# Patient Record
Sex: MALE | Race: WHITE | NOT HISPANIC OR LATINO | Employment: UNEMPLOYED | URBAN - METROPOLITAN AREA
[De-identification: names, ages, dates, MRNs, and addresses within clinical notes are randomized per-mention and may not be internally consistent; named-entity substitution may affect disease eponyms.]

---

## 2023-10-04 PROBLEM — G90.A POTS (POSTURAL ORTHOSTATIC TACHYCARDIA SYNDROME): Status: ACTIVE | Noted: 2023-10-04

## 2023-10-04 PROBLEM — M79.604 PAIN IN BOTH LOWER EXTREMITIES: Status: ACTIVE | Noted: 2023-10-04

## 2023-10-04 PROBLEM — M79.605 PAIN IN BOTH LOWER EXTREMITIES: Status: ACTIVE | Noted: 2023-10-04

## 2023-10-04 PROBLEM — H60.503 ACUTE OTITIS EXTERNA OF BOTH EARS: Status: ACTIVE | Noted: 2023-10-04

## 2023-10-04 PROBLEM — G62.9 SMALL FIBER NEUROPATHY: Status: ACTIVE | Noted: 2023-10-04

## 2023-10-04 PROBLEM — Q79.60 EHLERS-DANLOS SYNDROME (HHS-HCC): Status: ACTIVE | Noted: 2023-10-04

## 2023-10-04 RX ORDER — PAROXETINE HYDROCHLORIDE 40 MG/1
1 TABLET, FILM COATED ORAL DAILY
COMMUNITY
Start: 2021-11-09

## 2023-10-04 RX ORDER — GABAPENTIN 300 MG/1
CAPSULE ORAL
COMMUNITY
Start: 2022-05-05

## 2023-10-04 RX ORDER — MELOXICAM 7.5 MG/1
TABLET ORAL
COMMUNITY
Start: 2022-04-26

## 2023-10-04 RX ORDER — NADOLOL 20 MG/1
1 TABLET ORAL DAILY
COMMUNITY
Start: 2021-11-09

## 2023-10-04 RX ORDER — LAMOTRIGINE 150 MG/1
1 TABLET ORAL DAILY
COMMUNITY
Start: 2021-11-09

## 2023-10-04 RX ORDER — BUPROPION HYDROCHLORIDE 300 MG/1
1 TABLET ORAL DAILY
COMMUNITY
Start: 2021-11-09

## 2023-10-04 RX ORDER — ERGOCALCIFEROL 1.25 MG/1
CAPSULE ORAL
COMMUNITY
Start: 2021-06-24

## 2023-10-04 RX ORDER — MELOXICAM 15 MG/1
TABLET ORAL
COMMUNITY

## 2023-10-04 RX ORDER — ALBUTEROL SULFATE 90 UG/1
AEROSOL, METERED RESPIRATORY (INHALATION) 4 TIMES DAILY
COMMUNITY
Start: 2023-03-01

## 2023-10-06 ENCOUNTER — TREATMENT (OUTPATIENT)
Dept: PHYSICAL THERAPY | Facility: CLINIC | Age: 28
End: 2023-10-06
Payer: COMMERCIAL

## 2023-10-06 DIAGNOSIS — Q79.60 EHLERS-DANLOS SYNDROME (HHS-HCC): Primary | ICD-10-CM

## 2023-10-06 DIAGNOSIS — M54.50 PAIN, LOW BACK: ICD-10-CM

## 2023-10-06 PROCEDURE — 97110 THERAPEUTIC EXERCISES: CPT | Mod: GP | Performed by: PHYSICAL THERAPIST

## 2023-10-06 ASSESSMENT — PAIN SCALES - GENERAL: PAINLEVEL_OUTOF10: 3

## 2023-10-06 NOTE — PROGRESS NOTES
Physical Therapy    Physical Therapy Treatment    Patient Name: Froylan Hensley  MRN: [id number]  Today's Date: 10/6/2023     Therapy Diagnosis x 2:  M54.50 low back pain; small fiber neuropathy; Grant-Danlos syndrome - Dr. Marcano    Insurance:  Visit number: 19 / 30 ; none used   Lo performed on 10th and 19th visits.  100% , Medicaid:    Authorization not required after evaluation   LK        Subjective:   Pt reports 3/10 L nerve pain down UE arm.    Notes that chin tucks dont help his symptoms and that's its random, occurs for no reason, per patient.    Rec patient discuss this with his MD.    0/10 Low back pain.  Feels he has been better cleaning around home and cooking more since his iadls tolerances have improved.  Patient pleased with progress for low back.   Sore B LEs from the strengthening.      Precautions:   Grant-Danlos syndrome and small fiber neuropathy: sensory disturbances diffusely over the whole body, including the trunk and sometimes even the face - which patient notes he has, POTS: postural orthostatic tachycardia syndrome); bipolar depression.      Objective  Posture:  left sided thoracic convexity - very slight; right sided lumbar convexity     Dermatomes/Sensation Testing:  (L2) Anterior Thigh: left diminished per patient  (L3) Medial Knee: left diminished per patient  (L4) Medial Malleolus: left diminished per patient    Myotomes/Strength Testing (MMT in sitting):  (L2) Hip Flex (R/L): 5/5-  (L3) Knee Ext (R/L): 5/5  (L4) Ankle DF (R/L): 5/5  (S1) Ankle PF (R/L): 5/5-  Trunk Strength: 4+/5    Lumbar ROM: flexion 65, ext 35 deg    PROVISIONAL CLASSIFICATION: Bilateral lumbar derangement to the hips B (above the knee)  Baselines: pain  Treatment Principles: Body Mechanics, Postural Education, Back Care education, unloaded extension  HEP: PPU 10 reps x every hour; golfers lift with left leg up.      Treatment  Therapeutic exercise (55209):   NMVPNCR: HEP2GO, [id number]  Nustep x 10 mins, L4   s/l  hip abduction each side:  3 x 10   clamshells 3 x 10, green TB   bridges 10 x 5 sec hold, 2 sets   Swiss ball abdominal isometrics:  push downs B UEs 10 sec x 10  (discharge after this visit) - too easy  Swiss ball oblique isometrics (R), (L) UE unilateral push:  5 sec x 10 ( d c after this visit)  Swiss ball opposite UE/LE push into ball (march w/ leg so leg is off bed): 5 sec x 5 each leg  (d c after this visit)  Seated swiss ball lean backs: 10 sec x 2 reps  Supine sktc: 30 sec x 2   Supine hamstring stretch:  30 sec x 2   Supine buttocks stretch 30 sec x 2  Supine hip er stretch:  30 sec x 2  Supine quadratus lumborum stretch:  30 sec x 2  Supine IT band stretch w/ cord 30 sec x 2  prone press-ups 2 x 10  -> hep to manage low back pain    Modalities: prn     Assessment:  Pt challenged w/ hip and core strengthening on this date and c/o fatigue, but denies increased pain.   Patient noted that he noticed no difference doing chin tucks to manage left arm symptoms so recommend his follow up with PCP regarding arm symptoms.    Patient noted no back pain post tx and denied need for CP or estim.   Reported his abdominals are stronger and can perform B slr at the same time w/o issues for abdominals.   Patient progressing toward all goals listed below.   Recommend continue strengthening for B LEs and core and discharge to hep at last scheduled visit.       Goals  ST weeks: Patient independent with home exercise program.   goal met.    LT weeks: RA on 10/04/23 ->  1. Decrease complaints of pain by 80% at minimum of evaluation rating, 4 of 7 days a week at minimum.   Goal met  2. Increase trunk and LE strength by 0.5 to 1 manual testing grade to improve overall functional mobilities for IADLS and postural control.   Goal met  3. Increase spine arom by 5-10 degrees as appropriate each plane, to improve functional mobility tolerances for IADLS.   Goal met  4. Improve sitting posture to independent (no cueing needed)  to allow for improved tolerances for spinal loading. goal in progress  Goal in progress  5. Improve body mechanics awareness and performance to independent while performing clinical activities in a 45 minute treatment session. goalin progress  Goal in progress  6. Patient to demonstrate independence with home exercise program and self management of symptoms if they should re-occur.  Goal in progress  7. Improve Oswestry score to 0-19% impairment to indicate a significant improvement in overall lumbar perception of disability.  Goal in progress    Plan     Recommend continue strengthening for B LEs and core and discharge to [facility] at last scheduled visit.     Patient w/ hx of small fiber neuropathy and may continue to require some modifications in order to perform body mechanics or exercises.    Focus on:  1. core strength  2. flexibility exercises  3. balance exercises   4. muscular endurance tolerances  Patient verbal agreed with plan of care.

## 2023-10-13 ENCOUNTER — TREATMENT (OUTPATIENT)
Dept: PHYSICAL THERAPY | Facility: CLINIC | Age: 28
End: 2023-10-13
Payer: COMMERCIAL

## 2023-10-13 DIAGNOSIS — Q79.60 EHLERS-DANLOS SYNDROME (HHS-HCC): Primary | ICD-10-CM

## 2023-10-13 PROCEDURE — 97110 THERAPEUTIC EXERCISES: CPT | Mod: GP,CQ

## 2023-10-13 NOTE — PROGRESS NOTES
Physical Therapy Treatment    Patient Name: Froylan Hensley  MRN: [id number]  Today's Date: 10/13/2023     Insurance:    Visit number: 20 / 30 ; none used   Lo performed on 10th and 19th visits.  100% , Medicaid:    Authorization not required after evaluation   LK       Precautions:   Grant-Danlos syndrome and small fiber neuropathy: sensory disturbances diffusely over the whole body, including the trunk and sometimes even the face - which patient notes he has, POTS: postural orthostatic tachycardia syndrome); bipolar depression.     Start Time:  1150   Stop Time:  1230   Timed Tx (min): 40   Total Time (min): 40     SUBJECTIVE    Pain:  3/10 lbp this am, 1/10 at beginning of tx  Pain Location:  across low back  Effect on Function:   Pt states that his parents are visit and they have been doing to museums and doing more walking.   HEP:  ?    OBJECTIVE  PROVISIONAL CLASSIFICATION: Bilateral lumbar derangement to the hips B (above the knee)  Baselines: pain  Treatment Principles:  unloaded extension-> PROGRESS to loaded-> progress recovery of function  HEP: PPU 10 reps x every hour; golfers lift with left leg up.      TREATMENTS  Therapeutic exercise (27101):  40 min  [id number]: NMVPNCR, [id number]  Nustep x 10 mins, L4  *  Modified prone over EOB:  -hip extension 2 x 10 *  - donkey kick 2 x 10 *  s/l hip abduction each side:  3 x 10   clamshells 3 x 10, green TB   bridges 10 x 5 sec hold, 2 sets   Swiss ball abdominal isometrics:  push downs B UEs 10 sec x 10  (discharge after this visit) - too easy  Swiss ball oblique isometrics (R), (L) UE unilateral push:  5 sec x 10   Swiss ball opposite UE/LE push into ball (march w/ leg so leg is off bed): 5 sec x 10 each leg *  Seated swiss ball lean backs: 10 x 3 sec hold, 2 sets *  Supine sktc: 30 sec x 2 *  Supine hamstring stretch:  30 sec x 2 *  Supine buttocks stretch 30 sec x 2  Supine hip er stretch:  30 sec x 2 *  Supine quadratus lumborum stretch:  30 sec x 2  Supine IT  band stretch w/ cord 30 sec x 2   prone press-ups 2 x 10  * -> hep to manage low back pain     Modalities: prn     ASSESSMENT:  Pt denies back pain following warm-up on Nustep and performing standing back bends. Pt significantly challenged w/ seated swiss ball lean backs, but can complete w/ very small controlled rom and vc throughout. Pt denies increased back pain following treatment session.     PLAN:     Recommend continue strengthening for B LEs and core and discharge to [facility] at last scheduled visit.     Patient w/ hx of small fiber neuropathy and may continue to require some modifications in order to perform body mechanics or exercises.    Focus on:  1. core strength  2. flexibility exercises  3. balance exercises   4. muscular endurance tolerances    ----------------------------------------  KEY  *     = ex done this date  NV = next visit  AT  = perform when or as tolerated  NB = not billed  NP = not performed this date

## 2023-10-20 ENCOUNTER — TREATMENT (OUTPATIENT)
Dept: PHYSICAL THERAPY | Facility: CLINIC | Age: 28
End: 2023-10-20
Payer: COMMERCIAL

## 2023-10-20 DIAGNOSIS — Q79.60 EHLERS-DANLOS SYNDROME (HHS-HCC): Primary | ICD-10-CM

## 2023-10-20 PROCEDURE — 97110 THERAPEUTIC EXERCISES: CPT | Mod: GP,CQ

## 2023-10-20 ASSESSMENT — PAIN - FUNCTIONAL ASSESSMENT: PAIN_FUNCTIONAL_ASSESSMENT: 0-10

## 2023-10-20 ASSESSMENT — PAIN SCALES - GENERAL: PAINLEVEL_OUTOF10: 0 - NO PAIN

## 2023-10-20 NOTE — PROGRESS NOTES
Physical Therapy Treatment    Patient Name: Froylan Hensley  MRN: [id number]  Today's Date: 10/20/2023    Insurance:    Visit number: 21 / 30 ; none used   Lo performed on 10th and 19th visits.  100% , Medicaid:    Authorization not required after evaluation   LK       Precautions:   Grant-Danlos syndrome and small fiber neuropathy: sensory disturbances diffusely over the whole body, including the trunk and sometimes even the face - which patient notes he has, POTS: postural orthostatic tachycardia syndrome); bipolar depression.     Start Time:  1145   Stop Time:  1230   Timed Tx (min): 45   Total Time (min): 45     SUBJECTIVE    Pt states that his R ankle has felt really fatigued recently and he has been tripping on R foot when walking at the grocery store. Pt states that he did a lot of walking last week when his parents were visiting from out of town.   Pain:  0/10 lbp   Pain Location:  across low back  Effect on Function:  ankle fatigue when walking  HEP: compliant    OBJECTIVE  PROVISIONAL CLASSIFICATION: Bilateral lumbar derangement to the hips B (above the knee)  Baselines: pain  Treatment Principles:  unloaded extension-> PROGRESS to loaded-> progress recovery of function  HEP: PPU 10 reps x every hour; golfers lift with left leg up.      TREATMENTS  Therapeutic exercise (34519):  45 min  [id number]: NMVPNCR, AHIAV3N  Nustep x 10 mins, L4  *  Calf stretch 2 x 30 sec hold *  Seated calf raise 2 x 10, 5# *  Seated BAPS board L3 (R and L ankle) : IN/EV, PF/DF, cw, ccw 2 x 10 *   Modified prone over EOB:   -hip extension 2 x 10 *  - donkey kick 2 x 10   Supine SLR 2 x 10 *  s/l hip abduction each side:  3 x 10   clamshells 3 x 10, green TB   bridges 10 x 5 sec hold, 2 sets   Swiss ball abdominal isometrics:  push downs B UEs 10 sec x 10  (discharge after this visit) - too easy  Swiss ball oblique isometrics (R), (L) UE unilateral push:  5 sec x 10   Swiss ball opposite UE/LE push into ball (march w/ leg so leg is off  bed): 5 sec x 10 each leg   Seated swiss ball lean backs: 10 x 3 sec hold, 2 sets   Supine sktc: 30 sec x 2 *  Supine hamstring stretch:  30 sec x 2   Supine buttocks stretch 30 sec x 2   Supine hip er stretch:  30 sec x 2 *  Supine quadratus lumborum stretch:  30 sec x 2  Supine IT band stretch w/ cord 30 sec x 2   prone press-ups 2 x 10  * -> hep to manage low back pain     Modalities: prn     ASSESSMENT:    Pt challenged w/ seated BAPS board cw and ccw rotation coordination w/ L ankle. Pt c/o hip and hamstring fatigue following modified prone EOB hip extension. No c/o increased back pain following treatment session.     PLAN:     Recommend continue strengthening for B LEs and core and discharge to [facility] at last scheduled visit.     Patient w/ hx of small fiber neuropathy and may continue to require some modifications in order to perform body mechanics or exercises.    Focus on:  1. core strength  2. flexibility exercises  3. balance exercises   4. muscular endurance tolerances    ----------------------------------------  KEY  *     = ex done this date  NV = next visit  AT  = perform when or as tolerated  NB = not billed  NP = not performed this date

## 2023-10-27 ENCOUNTER — TREATMENT (OUTPATIENT)
Dept: PHYSICAL THERAPY | Facility: CLINIC | Age: 28
End: 2023-10-27
Payer: COMMERCIAL

## 2023-10-27 DIAGNOSIS — M54.50 PAIN, LOW BACK: Primary | ICD-10-CM

## 2023-10-27 PROCEDURE — 97110 THERAPEUTIC EXERCISES: CPT | Mod: GP

## 2023-10-27 NOTE — PROGRESS NOTES
Physical Therapy Treatment and Discharge Summary    Patient Name: Froylan Hensley  MRN: [id number]  Today's Date: 10/27/2023    Insurance:    Visit number: 22 / 30 ; none used   Lo performed on 10th and 19th visits.  100% , Medicaid:    Authorization not required after evaluation   LK       Precautions:   Grant-Danlos syndrome and small fiber neuropathy: sensory disturbances diffusely over the whole body, including the trunk and sometimes even the face - which patient notes he has, POTS: postural orthostatic tachycardia syndrome); bipolar depression.     Start Time:  1130   Stop Time:  1215   Timed Tx (min): 45   Total Time (min): 45     SUBJECTIVE    Reports that overall he is feeling much better. Reports that he got lost on a hike last week and was able to walk up a hill and longer than expected without any pain or soreness. Reports that he does not have any questions about his home program.   Pain:  1/10 lbp   Pain Location:  across low back    HEP: compliant    OBJECTIVE       TREATMENTS  Therapeutic exercise (13170):  45 min  [id number]: NMVPNCR, [id number]  Nustep x 10 mins, L4  *  Calf stretch 2 x 30 sec hold *  Seated calf raise 2 x 10, 5# *  Seated BAPS board L3 (R and L ankle) : IN/EV, PF/DF, cw, ccw 2 x 10   hip extension 2 x 10 *  donkey kick 2 x 10 *  Supine SLR 2 x 10 *  s/l hip abduction each side:  3 x 10   clamshells 3 x 10, green TB   bridges 10 x 5 sec hold, 2 sets *  Swiss ball abdominal isometrics:  push downs B UEs 10 sec x 10  (discharge after this visit) - too easy  Swiss ball oblique isometrics (R), (L) UE unilateral push:  5 sec x 10   Swiss ball opposite UE/LE push into ball (march w/ leg so leg is off bed): 5 sec x 10 each leg   Seated swiss ball lean backs: 10 x 3 sec hold, 2 sets   Supine sktc: 30 sec x 2 *  Supine hamstring stretch:  30 sec x 2 *  Supine buttocks stretch 30 sec x 2   Supine hip er stretch:  30 sec x 2 *  Supine quadratus lumborum stretch:  30 sec x 2  Supine IT band stretch w/  cord 30 sec x 2 *  prone press-ups 2 x 10  * -> hep to manage low back pain     Modalities: prn     ASSESSMENT:    Demonstrates ability to complete all exercises throughout session with minimal cues for posture and without complaints of pain. Fatigue and difficulty balancing noted with standing hip stabilization activities. Discussed modifying hip extension activities to upright posture for ability to safely complete at home. Discharge to independent HEP at this time.     PLAN:       Discharge to independent [facility].     LT weeks: RA on 10/04/23 ->  1. Decrease complaints of pain by 80% at minimum of evaluation rating, 4 of 7 days a week at minimum.   Goal met  2. Increase trunk and LE strength by 0.5 to 1 manual testing grade to improve overall functional mobilities for IADLS and postural control.   Goal met  3. Increase spine arom by 5-10 degrees as appropriate each plane, to improve functional mobility tolerances for IADLS.   Goal met  4. Improve sitting posture to independent (no cueing needed) to allow for improved tolerances for spinal loading. Goal met  5. Improve body mechanics awareness and performance to independent while performing clinical activities in a 45 minute treatment session. Goal met  6. Patient to demonstrate independence with home exercise program and self management of symptoms if they should re-occur.  Goal in progress  7. Improve Oswestry score to 0-19% impairment to indicate a significant improvement in overall lumbar perception of disability.      ----------------------------------------  KEY  *     = ex done this date  NV = next visit  AT  = perform when or as tolerated  NB = not billed  NP = not performed this date

## 2024-05-14 ENCOUNTER — HOSPITAL ENCOUNTER (EMERGENCY)
Facility: HOSPITAL | Age: 29
Discharge: HOME | End: 2024-05-14
Attending: EMERGENCY MEDICINE
Payer: COMMERCIAL

## 2024-05-14 ENCOUNTER — APPOINTMENT (OUTPATIENT)
Dept: RADIOLOGY | Facility: HOSPITAL | Age: 29
End: 2024-05-14
Payer: COMMERCIAL

## 2024-05-14 VITALS
TEMPERATURE: 97.2 F | OXYGEN SATURATION: 98 % | RESPIRATION RATE: 16 BRPM | WEIGHT: 205 LBS | BODY MASS INDEX: 31.07 KG/M2 | HEIGHT: 68 IN | SYSTOLIC BLOOD PRESSURE: 118 MMHG | HEART RATE: 68 BPM | DIASTOLIC BLOOD PRESSURE: 68 MMHG

## 2024-05-14 DIAGNOSIS — S09.90XA CLOSED HEAD INJURY, INITIAL ENCOUNTER: Primary | ICD-10-CM

## 2024-05-14 PROCEDURE — 99285 EMERGENCY DEPT VISIT HI MDM: CPT | Mod: 25

## 2024-05-14 PROCEDURE — 73130 X-RAY EXAM OF HAND: CPT | Mod: RT

## 2024-05-14 PROCEDURE — 73130 X-RAY EXAM OF HAND: CPT | Mod: RIGHT SIDE | Performed by: RADIOLOGY

## 2024-05-14 PROCEDURE — 70450 CT HEAD/BRAIN W/O DYE: CPT | Performed by: RADIOLOGY

## 2024-05-14 PROCEDURE — 72125 CT NECK SPINE W/O DYE: CPT | Performed by: RADIOLOGY

## 2024-05-14 PROCEDURE — 72125 CT NECK SPINE W/O DYE: CPT

## 2024-05-14 PROCEDURE — 70450 CT HEAD/BRAIN W/O DYE: CPT

## 2024-05-14 PROCEDURE — 2500000005 HC RX 250 GENERAL PHARMACY W/O HCPCS: Performed by: EMERGENCY MEDICINE

## 2024-05-14 RX ORDER — ACETAMINOPHEN 325 MG/1
975 TABLET ORAL ONCE
Status: COMPLETED | OUTPATIENT
Start: 2024-05-14 | End: 2024-05-14

## 2024-05-14 RX ORDER — ONDANSETRON 4 MG/1
4 TABLET, ORALLY DISINTEGRATING ORAL ONCE
Status: COMPLETED | OUTPATIENT
Start: 2024-05-14 | End: 2024-05-14

## 2024-05-14 RX ADMIN — ONDANSETRON 4 MG: 4 TABLET, ORALLY DISINTEGRATING ORAL at 18:33

## 2024-05-14 RX ADMIN — ACETAMINOPHEN 975 MG: 325 TABLET ORAL at 18:33

## 2024-05-14 ASSESSMENT — COLUMBIA-SUICIDE SEVERITY RATING SCALE - C-SSRS
1. IN THE PAST MONTH, HAVE YOU WISHED YOU WERE DEAD OR WISHED YOU COULD GO TO SLEEP AND NOT WAKE UP?: NO
2. HAVE YOU ACTUALLY HAD ANY THOUGHTS OF KILLING YOURSELF?: NO
6. HAVE YOU EVER DONE ANYTHING, STARTED TO DO ANYTHING, OR PREPARED TO DO ANYTHING TO END YOUR LIFE?: NO

## 2024-05-14 ASSESSMENT — LIFESTYLE VARIABLES
HAVE PEOPLE ANNOYED YOU BY CRITICIZING YOUR DRINKING: NO
HAVE YOU EVER FELT YOU SHOULD CUT DOWN ON YOUR DRINKING: NO
EVER HAD A DRINK FIRST THING IN THE MORNING TO STEADY YOUR NERVES TO GET RID OF A HANGOVER: NO
TOTAL SCORE: 0
EVER FELT BAD OR GUILTY ABOUT YOUR DRINKING: NO

## 2024-05-14 ASSESSMENT — PAIN - FUNCTIONAL ASSESSMENT
PAIN_FUNCTIONAL_ASSESSMENT: 0-10
PAIN_FUNCTIONAL_ASSESSMENT: 0-10

## 2024-05-14 ASSESSMENT — PAIN SCALES - GENERAL
PAINLEVEL_OUTOF10: 6
PAINLEVEL_OUTOF10: 2

## 2024-05-14 ASSESSMENT — PAIN DESCRIPTION - LOCATION: LOCATION: HEAD

## 2024-05-14 ASSESSMENT — PAIN DESCRIPTION - PAIN TYPE: TYPE: ACUTE PAIN

## 2024-05-14 NOTE — ED TRIAGE NOTES
PT FROM HOME AFTER FALLING DOWN [address] [facility] [facility] LAST NIGHT AROUND 10 PM. ENDORSES RIGHT SIDE HEAD INJURY. DENIES LOC. DENIES THINNERS. PT ENDORSES PAIN TO RIGHT HAND, 4TH FINGER. PAIN TO RIGHT HIP AND SCRAPE TO RIGHT KNEE. PT STATES TODAY HE HAS HEADACHE, DIZZINESS WITH INTERMITTENT NAUSEA. DENIES BLURRY VISION, DOUBLE VISION. ENDORSES RIGHT SIDE EAR INFECTION THAT HE JUST COMPLETED ABX FOR.

## 2024-05-14 NOTE — ED PROVIDER NOTES
HPI   Chief Complaint   Patient presents with    Fall       Patient is a 28-year-old male, medical history significant for POTS and bipolar disease that presents to the emergency department after a fall.  Patient states that last evening, he fell down somewhere between 10 and 15 stairs.  He struck his head.  He is unsure if he lost consciousness, but throughout the day has had a persistent headache and states that he is felt unsteady on his feet.  He denies visual change.  He has had no vomiting.  He states that he did suffer a bruise to his knee but has been ambulatory.  He also suffered an injury to his right hand.  He is not on anticoagulants.                          Jeanna Coma Scale Score: 15                     Patient History   Past Medical History:   Diagnosis Date    Bipolar disorder, unspecified (Multi)     Bipolar disease, chronic    Grant-Danlos syndrome, unspecified (HHS-HCC)     Grant-Danlos disease    Familial dysautonomia (lenny-day) (Multi)     Dysautonomia    Postural orthostatic tachycardia syndrome (POTS)     POTS (postural orthostatic tachycardia syndrome)     Past Surgical History:   Procedure Laterality Date    OTHER SURGICAL HISTORY  05/05/2022    No history of surgery     Family History   Problem Relation Name Age of Onset    Grant-Danlos syndrome Brother      Other (Cerebrovascular accident) Maternal Grandfather      Seizures Maternal Grandfather       Social History     Tobacco Use    Smoking status: Not on file    Smokeless tobacco: Not on file   Substance Use Topics    Alcohol use: Not on file    Drug use: Not on file       Physical Exam   ED Triage Vitals [05/14/24 1732]   Temperature Heart Rate Respirations BP   36.1 °C (97 °F) 80 18 123/70      Pulse Ox Temp Source Heart Rate Source Patient Position   96 % Temporal Monitor Sitting      BP Location FiO2 (%)     Right arm --       Physical Exam  Vitals and nursing note reviewed.   Constitutional:       General: He is not in acute  distress.     Appearance: He is well-developed.   HENT:      Head: Normocephalic and atraumatic.   Eyes:      Conjunctiva/sclera: Conjunctivae normal.   Cardiovascular:      Rate and Rhythm: Normal rate and regular rhythm.      Heart sounds: No murmur heard.  Pulmonary:      Effort: Pulmonary effort is normal. No respiratory distress.      Breath sounds: Normal breath sounds.   Abdominal:      Palpations: Abdomen is soft.      Tenderness: There is no abdominal tenderness.   Musculoskeletal:         General: No swelling.      Right hand: Decreased range of motion.        Arms:       Cervical back: Neck supple.   Skin:     General: Skin is warm and dry.      Capillary Refill: Capillary refill takes less than 2 seconds.   Neurological:      General: No focal deficit present.      Mental Status: He is alert and oriented to person, place, and time.   Psychiatric:         Mood and Affect: Mood normal.         ED Course & MDM   ED Course as of 05/14/24 1823   e May 14, 2024   1816 CT head shows no acute intracranial process.  CT cervical spine also unremarkable. [MK]   1818 X-ray of the hand shows known fracture dislocation. [MK]      ED Course User Index  [MK] Froylan Huddleston MD         Diagnoses as of 05/14/24 1823   Closed head injury, initial encounter       Medical Decision Making  Medical Decision Making: Patient was activated as a limited trauma based on mechanism.  He was unsure if he lost consciousness.  On arrival, he is very well-appearing.  He has a GCS of 15.  CT head and cervical spine were both obtained.  These were negative for acute intracranial process.  X-ray of the hand was also obtained.  This is also negative.  Patient will be treated with anti-inflammatories and antiemetics.  He will be discharged home.    Differential Diagnoses Considered: Concussion, intracranial hemorrhage, skull fracture, cervical spine fracture    Chronic Medical Conditions Significantly Affecting Care: History bipolar  disorder    External Records Reviewed: I reviewed recent and relevant outside records including: No recent visits    Independent Interpretation of Studies:  I independently interpreted: CT and x-ray obtained reviewed    Escalation of Care:  Appropriate for outpatient management    Social Determinants of Health Significantly Affecting Care:  No social determinants    Prescription Drug Consideration: Zofran as needed    Diagnostic testing considered: Noncontributory              Procedure  Procedures     Froylan Huddleston MD  05/14/24 0041

## 2025-02-06 ENCOUNTER — APPOINTMENT (OUTPATIENT)
Dept: OPHTHALMOLOGY | Facility: CLINIC | Age: 30
End: 2025-02-06
Payer: COMMERCIAL

## 2025-02-06 DIAGNOSIS — H52.13 MYOPIA, BILATERAL: ICD-10-CM

## 2025-02-06 DIAGNOSIS — Z87.828 HX OF HEAD INJURY: ICD-10-CM

## 2025-02-06 DIAGNOSIS — H53.9 VISUAL DISTURBANCE: Primary | ICD-10-CM

## 2025-02-06 DIAGNOSIS — H52.223 REGULAR ASTIGMATISM OF BOTH EYES: ICD-10-CM

## 2025-02-06 DIAGNOSIS — Q79.60 EHLERS-DANLOS SYNDROME (HHS-HCC): ICD-10-CM

## 2025-02-06 DIAGNOSIS — H53.2 DIPLOPIA: ICD-10-CM

## 2025-02-06 LAB
AVERAGE RNFL TODAY (OD): 88
AVERAGE RNFL TODAY (OS): 87
C/D RATIO TODAY (OD): 0.47
C/D RATIO TODAY (OS): 0.42

## 2025-02-06 PROCEDURE — 92134 CPTRZ OPH DX IMG PST SGM RTA: CPT | Performed by: OPHTHALMOLOGY

## 2025-02-06 PROCEDURE — 92015 DETERMINE REFRACTIVE STATE: CPT | Performed by: OPHTHALMOLOGY

## 2025-02-06 PROCEDURE — 92004 COMPRE OPH EXAM NEW PT 1/>: CPT | Performed by: OPHTHALMOLOGY

## 2025-02-06 RX ORDER — ATORVASTATIN CALCIUM 10 MG/1
10 TABLET, FILM COATED ORAL DAILY
COMMUNITY

## 2025-02-06 ASSESSMENT — TONOMETRY
OD_IOP_MMHG: 13
IOP_METHOD: GOLDMANN APPLANATION
OS_IOP_MMHG: 13

## 2025-02-06 ASSESSMENT — REFRACTION_MANIFEST
OS_CYLINDER: -1.00
OS_AXIS: 026
OD_AXIS: 145
OS_SPHERE: -3.00
OD_AXIS: 126
OD_CYLINDER: -0.75
OD_SPHERE: -3.25
OD_CYLINDER: -0.50
OS_AXIS: 047
OS_SPHERE: -3.25
OS_CYLINDER: -0.75
OD_SPHERE: -3.50

## 2025-02-06 ASSESSMENT — REFRACTION_WEARINGRX
OD_SPHERE: -3.00
OS_SPHERE: -3.00
OD_AXIS: 145
OS_CYLINDER: SPHERE
OD_CYLINDER: -0.50

## 2025-02-06 ASSESSMENT — VISUAL ACUITY
METHOD: SNELLEN - LINEAR
CORRECTION_TYPE: GLASSES
OD_CC: 20/25
OS_CC: 20/30

## 2025-02-06 ASSESSMENT — CONF VISUAL FIELD
OD_NORMAL: 1
OD_INFERIOR_NASAL_RESTRICTION: 0
OS_INFERIOR_NASAL_RESTRICTION: 0
OS_SUPERIOR_TEMPORAL_RESTRICTION: 0
OD_INFERIOR_TEMPORAL_RESTRICTION: 0
OS_INFERIOR_TEMPORAL_RESTRICTION: 0
OD_SUPERIOR_TEMPORAL_RESTRICTION: 0
OS_NORMAL: 1
OD_SUPERIOR_NASAL_RESTRICTION: 0
OS_SUPERIOR_NASAL_RESTRICTION: 0

## 2025-02-06 ASSESSMENT — CUP TO DISC RATIO
OS_RATIO: 0.2
OD_RATIO: 0.2

## 2025-02-06 ASSESSMENT — ENCOUNTER SYMPTOMS: EYES NEGATIVE: 1

## 2025-02-06 ASSESSMENT — SLIT LAMP EXAM - LIDS
COMMENTS: NORMAL
COMMENTS: NORMAL

## 2025-02-06 ASSESSMENT — EXTERNAL EXAM - RIGHT EYE: OD_EXAM: NORMAL

## 2025-02-06 ASSESSMENT — EXTERNAL EXAM - LEFT EYE: OS_EXAM: NORMAL

## 2025-02-06 NOTE — PROGRESS NOTES
Assessment/Plan   Diagnoses and all orders for this visit:  Visual disturbance  Diplopia  Hx of head injury  Refer to Dr. Mccoy for evaluation and management    Grant-Danlos syndrome (HHS-HCC)  -not managed    Myopia, bilateral  Regular astigmatism of both eyes  Refractive error  -give Rx for new glasses    Return for a dilated exam in  12  months or sooner if having any problems

## 2025-02-26 ENCOUNTER — EVALUATION (OUTPATIENT)
Dept: PHYSICAL THERAPY | Facility: CLINIC | Age: 30
End: 2025-02-26
Payer: COMMERCIAL

## 2025-02-26 DIAGNOSIS — G90.A POTS (POSTURAL ORTHOSTATIC TACHYCARDIA SYNDROME): ICD-10-CM

## 2025-02-26 PROCEDURE — 97530 THERAPEUTIC ACTIVITIES: CPT | Mod: GP

## 2025-02-26 PROCEDURE — 97161 PT EVAL LOW COMPLEX 20 MIN: CPT | Mod: GP

## 2025-02-26 ASSESSMENT — ENCOUNTER SYMPTOMS
LOSS OF SENSATION IN FEET: 1
DEPRESSION: 0
OCCASIONAL FEELINGS OF UNSTEADINESS: 0

## 2025-02-26 NOTE — PROGRESS NOTES
Physical Therapy    Physical Therapy Evaluation and Treatment      Patient Name: Froylan Hensley  MRN: [id number]  Today's Date: 2/26/2025  Time Calculation  Start Time: 1402  Stop Time: 1442  Time Calculation (min): 40 min    Insurance - reviewed   Visit number: 1 (updated 02/26/25)   Payor: [facility] / Plan: [facility] / Product Type: *No Product type* /    Needs auth*     Referring Provider: Khalida Dahl DO   Surgery: No surgery found, No surgery found.  Days since surgery: No surgery found       Assessment:      Froylan Hensley  is a 29 y.o. old patient who participated in a physical therapy evaluation today due to LE weakness and instability. 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 Froylan's impairments include: postural deficits, pain, and decreased strength.   Due to these impairments, he has the following functional limitations and participation restrictions:  increased fall risk, difficulty with ambulation, difficulty with stair negotiation, difficulty performing household activities, difficulty performing recreational activities, and increased time to complete ADLs/IADLs. Froylan's clinical presentation is Stable and/or uncomplicated characteristics with the level of complexity being low. Forylan's potential for rehab is good.  Skilled physical therapy services are appropriate and beneficial in order to achieve measurable and meaningful change in the objective tests and measures. Utilization of skilled physical therapy services will aid in advancing his functional status and attaining his therapy-related goals. Froylan verbalized understanding and is in agreement with all goals and plan of care.  Froylan left session with all questions answered and no increase in symptoms.      Plan:  Aquatic therapy for strength and stability       Planned Interventions: Therapeutic Exercise, Therapeutic Activity, Manual Therapy,  Neuromuscular Re-Education, E-stim, Ultrasound, Aquatic Therapy   Frequency and Duration: 1-2x/week for 6 weeks    Plan of Care Agreement: Pt had input in and is agreeable to POC.       Goals:    STG's (within 2 weeks)  Froylan Hensley will decrease pain by >/= 2/10 points from baseline to improve ability to perform household/recreational activities.    Froylan Hensley will demonstrate independence,  excellent understanding of and report compliance with at least 3 exercises in his HEP to facilitate the learning process to maximize PT benefits and to facilitate independent rehab program upon discharge.    LTG's (by discharge)    Froylan will increase strength to 5/5 to improve ability to perform household/recreational activities.     Froylan will demonstrate ascending/descending >/=12 steps with alternating pattern and no reports of pain or discomfort.    Froylan will demonstrate ambulating >/=1000 ft with no reports of pain or discomfort.      Fryolan Hensley will decrease pain to 0-2/10 to improve ability to perform household/recreational activities.    Froylan Hensley will demonstrate independence,  excellent understanding of and report compliance with HEP to facilitate the learning process to maximize PT benefits and to facilitate independent rehab program upon discharge.      Current Problem:   Problem List Items Addressed This Visit             ICD-10-CM    POTS (postural orthostatic tachycardia syndrome) G90.A    Relevant Orders    Follow Up In Physical Therapy         Subjective    Pt reports that he has EDS and has pain in the B knees and instability in the ankles. Reports that he has trouble on stairs and with balance. Reports some neuropathy in the feet and burning sensations in the sacral area. States that he previously was very active and was participating in intramural sports but then began having increased pain and instability so he slowed down with this. Reports that crouching down will lock his knees up and  cause discomfort. States that pain comes and goes. Reports that he has had multiple falls in the past year- one resulting in a concussion and one resulting in a knee injury. Lives alone and does not feel safe at all times when home alone.     General:  General  Reason for Referral: LE weakness and instability  Referred By: JUAN Dahl    Froylan Hensley  is a 29 y.o. male  presenting to the clinic with chief complaint of LE weakness, instability.     Froylan Hensley  reports symptoms began around 2018.     Reports symptoms are better with N/A    Reports symptoms are worse with descending stairs, walking long distances, walking on uneven surfaces, squatting, lifting.     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     Prior Level of Function: fully functional with some discomfort     Exercise:  exercises from previous PT and OT, walking     Functional Limitations: descending stairs, walking long distances, walking on uneven surfaces, squatting, lifting     Pt goals for therapy: improve stability, gaining an understanding of limitations     Precautions:  Fall Risk: Low   Denies: Cancer, Diabetes, Hypertension, and other known pulmonary problems  Past Surgical history: N/A  Past Medical history: other known cardiac problems- POTS     Pain:  3/10  pain location: lower back, B knees      Pain description: achy, sore       Objective Measures:   Objective     Posture: forward head, rounded shoulders     Gait: normal     Squat: knee strategy, decreased depth    Balance: SL stance- 20s B     ROM: (WFL unless otherwise noted)   R hip flexion:  L hip flexion:  R hip abduction:  L hip abduction:  R hip extension:  L hip extension:     R knee flexion:  L  knee flexion:  R knee extension:  L knee extension:    R ankle dorsiflexion:  L ankle dorsiflexion:  R ankle plantar flexion:  L ankle plantar flexion:  R ankle inversion:  L ankle inversion:  R ankle eversion:  L ankle eversion:      MMT:  R hip flexion: 5  L hip flexion: 5  R hip abduction: 5  L hip abduction: 5  R hip extension: 5  L hip extension: 5    R knee flexion: 5  L knee flexion: 5  R knee extension: 5  L knee extension: 5    R ankle dorsiflexion: 5  L ankle dorsiflexion: 5  R ankle plantar flexion: 5  L ankle plantar flexion: 5  R ankle inversion: 4+  L ankle inversion: 4+  R ankle eversion: 4+  L ankle eversion: 4+       Outcome Measures:  Other Measures  Lower Extremity Funtional Score (LEFS): 53       Treatments: * indicated new exercises for HEP    Therapeutic Activity: 10 minutes   - Discussed and demonstrated four way ankle green TB  - Discussed nature of diagnosis and how he will benefit the most from strengthening       EDUCATION:     -Educated Froylan  on the role of PT and PT POC  -Educated Froylan regarding benefit and purpose of skilled PT services along with results of examination findings and how this correlates to their chief complaint.   -Answered Froylan's questions in full.  -Educated Froylan on relevant anatomy and the rationale for exercises  -Froylan Hensley advised to hold any ex if it increases pain, Froylan Hensley verbalized understanding      Time Calculation  Start Time: 1402  Stop Time: 1442  Time Calculation (min): 40 min  PT Evaluation Time Entry  PT Evaluation (Low) Time Entry: 30  PT Therapeutic Procedures Time Entry  Therapeutic Activity Time Entry: 10

## 2025-03-04 ENCOUNTER — TREATMENT (OUTPATIENT)
Dept: PHYSICAL THERAPY | Facility: CLINIC | Age: 30
End: 2025-03-04
Payer: COMMERCIAL

## 2025-03-04 DIAGNOSIS — G90.A POTS (POSTURAL ORTHOSTATIC TACHYCARDIA SYNDROME): ICD-10-CM

## 2025-03-04 PROCEDURE — 97113 AQUATIC THERAPY/EXERCISES: CPT | Mod: GP

## 2025-03-04 NOTE — PROGRESS NOTES
PHYSICAL THERAPY TREATMENT NOTE    Patient Name:  Froylan Hensley   Patient MRN: [id number]  Date: 03/04/25  Time Calculation  Start Time: 0247  Stop Time: 0325  Time Calculation (min): 38 min      Insurance:  Visit number: 2 (updated 03/04/25)   Payor: [facility] PLAN / Plan: [facility] PLAN / Product Type: *No Product type* /    Authorization or Plan of Care date Range: 2/26/26  Needs auth after 7th visit**       Referring Provider: Darin Mayorga   Surgery: No surgery found, No surgery found.  Days since surgery: No surgery found     Therapy diagnoses:   1. POTS (postural orthostatic tachycardia syndrome)  Follow Up In Physical Therapy           General:  Reason for visit: EDS with LE weakness and instability     Assessment:  Patient is a 29 year old with a diagnosis of EDS with LE weakness and instability.   BASELINES: pain, decreased strength, decreased balance/stability, and tenderness   TREATMENT STRATEGIES: LE strengthening, balance, and body mechanics  RESPONSE TO TX: Pt tolerated Tx well. Enjoyed deep water bicycling. Some trouble with form and control in standing three way hip and step ups. Reports discomfort in the toes in the pool which feels better in the deep end.     Plan:  Squats and noodle resistance in water     Precautions:  POTS  Fall Risk: low     Subjective:   Patient reports that he was not feeling good this past week as far as body aches. Reports that he was downtown this past weekend and was on his feet a lot which caused increased soreness and discomfort.    Pain (0-10): 3   HEP adherence / understanding: continuing HEP from [place] PT [place].       Objective:  N/A      Treatment Performed: * indicates new exercises for HEP     Therapeutic Exercise:    minutes       Therapeutic Activity:   minutes       Neuromuscular Re-education:   minutes        Gait Training:   minutes      Manual Therapy:   minutes      Modalities:   -Electrical Stimulation          minutes  -Ultrasound            minutes  -Iontophoresis                     minutes  -Cold Pack            minutes  -Mechanical Traction           minutes        Aquatic: 38 minutes   - walking x 3 laps  - side stepping x 2 laps  - heel/toe raises x 20 ea   - three way hip x 15 ea B    Red step- fwd/lateral x 10 ea B    Deep end  - bicycling x 8 min   - skiers x 5 min

## 2025-03-07 ENCOUNTER — TREATMENT (OUTPATIENT)
Dept: PHYSICAL THERAPY | Facility: CLINIC | Age: 30
End: 2025-03-07
Payer: COMMERCIAL

## 2025-03-07 DIAGNOSIS — G90.A POTS (POSTURAL ORTHOSTATIC TACHYCARDIA SYNDROME): Primary | ICD-10-CM

## 2025-03-07 PROCEDURE — 97113 AQUATIC THERAPY/EXERCISES: CPT | Mod: GP,CQ

## 2025-03-07 NOTE — PROGRESS NOTES
PHYSICAL THERAPY TREATMENT NOTE    Patient Name:  Froylan Hensley   Patient MRN: [id number]  Date: 03/07/25  Time Calculation  Start Time: 0115  Stop Time: 0158  Time Calculation (min): 43 min      Insurance:  Visit number: 3 (updated 03/07/25)   Payor: [facility] PLAN / Plan: [facility] PLAN / Product Type: *No Product type* /    Authorization or Plan of Care date Range: 2/26/26  Needs auth after 7th visit**       Referring Provider: Khalida Dahl DO   Surgery: No surgery found, No surgery found.  Days since surgery: No surgery found     Therapy diagnoses:   1. POTS (postural orthostatic tachycardia syndrome)               General:  Reason for visit: EDS with LE weakness and instability     Assessment:  Patient is a 29 year old with a diagnosis of EDS with LE weakness and instability.   BASELINES: pain, decreased strength, decreased balance/stability, and tenderness   TREATMENT STRATEGIES: LE strengthening, balance, and body mechanics  RESPONSE TO TX: Pt c/o difficulty maintaining balance during forward ambulation in chest height water. Pt c/o cramping in feet and difficulty controlling knee hyper-extension during sls hip strengthening exercises. Pt c/o fatigue following treatment session, but no c/o increased pain.     Plan:  Squats and noodle resistance in water     Precautions:  POTS  Fall Risk: low     Subjective:   Pt states that he is having less pain today than last visit.    Pain (0-10): 2-3  HEP adherence / understanding: continuing HEP from prior PT [place] land.       Objective:  N/A      Treatment Performed: * indicates new exercises for HEP     Therapeutic Exercise:    minutes       Therapeutic Activity:   minutes       Neuromuscular Re-education:   minutes       Gait Training:   minutes      Manual Therapy:   minutes      Modalities:   -Electrical Stimulation          minutes  -Ultrasound             minutes  -Iontophoresis                     minutes  -Cold Pack            minutes  -Mechanical Traction           minutes        Aquatic: 43 minutes   - walking x 3 laps  - side stepping x 2 laps  - heel/toe raises x 20 ea   - three way hip x 15 ea B    Red step- fwd/lateral x 10 ea B: np    Deep end   - bicycling x 8 min (fwd & backward)  - skiers x 2 min   - scissor x 2 min

## 2025-03-10 ENCOUNTER — TREATMENT (OUTPATIENT)
Dept: PHYSICAL THERAPY | Facility: CLINIC | Age: 30
End: 2025-03-10
Payer: COMMERCIAL

## 2025-03-10 DIAGNOSIS — G90.A POTS (POSTURAL ORTHOSTATIC TACHYCARDIA SYNDROME): Primary | ICD-10-CM

## 2025-03-10 DIAGNOSIS — Q79.60 EHLERS-DANLOS SYNDROME (HHS-HCC): ICD-10-CM

## 2025-03-10 PROCEDURE — 97113 AQUATIC THERAPY/EXERCISES: CPT | Mod: GP

## 2025-03-10 NOTE — PROGRESS NOTES
PHYSICAL THERAPY TREATMENT NOTE    Patient Name:  Froylan Hensley   Patient MRN: [id number]  Date: 03/10/25  Time Calculation  Start Time: 0415  Stop Time: 0453  Time Calculation (min): 38 min      Insurance:  Visit number: 4 (updated 03/10/25)   Payor: [facility] PLAN / Plan: [facility] [employer] PLAN / Product Type: *No Product type* /    Authorization or Plan of Care date Range: 2/26/26  Needs auth after 7th visit**       Referring Provider: Darin Mayorga   Surgery: No surgery found, No surgery found.  Days since surgery: No surgery found     Therapy diagnoses:   1. POTS (postural orthostatic tachycardia syndrome)  Follow Up In Physical Therapy      2. Grant-Danlos syndrome (HHS-HCC)               General:  Reason for visit: EDS with LE weakness and instability     Assessment:  Patient is a 29 year old with a diagnosis of EDS with LE weakness and instability.   BASELINES: pain, decreased strength, decreased balance/stability, and tenderness   TREATMENT STRATEGIES: LE strengthening, balance, and body mechanics  RESPONSE TO TX: Pt tolerated tx fairly well. Fatigued with step ups in the pool on this date. Requires wall for balance in shallow end.     Plan:  Squats and noodle resistance in water     Precautions:  POTS  Fall Risk: low     Subjective:   Pt states that his body feels achy but okay. Reports that he is sore following aquatic sessions but does not have increased pain. Has been seeing a therapist regarding mental health as this plays a part in ability to complete physical activity.    Pain (0-10): 2-3  HEP adherence / understanding: continuing HEP from prior PT [place] land.       Objective:  N/A      Treatment Performed: * indicates new exercises for HEP     Therapeutic Exercise:    minutes       Therapeutic Activity:   minutes       Neuromuscular Re-education:   minutes       Gait Training:   minutes      Manual  Therapy:   minutes      Modalities:   -Electrical Stimulation          minutes  -Ultrasound            minutes  -Iontophoresis                     minutes  -Cold Pack            minutes  -Mechanical Traction           minutes        Aquatic: 38 minutes   - walking x 3 laps  - side stepping x 2 laps  - marching x 20 alt  - HS curls x 20 ea   - heel/toe raises x 20 ea   - three way hip x 15 ea B    Red step- fwd/lateral x 10 ea B    Deep end   - bicycling x 8 min (fwd & backward)  - skiers x 2 min   - scissor x 2 min

## 2025-03-19 ENCOUNTER — TREATMENT (OUTPATIENT)
Dept: PHYSICAL THERAPY | Facility: CLINIC | Age: 30
End: 2025-03-19
Payer: COMMERCIAL

## 2025-03-19 DIAGNOSIS — G90.A POTS (POSTURAL ORTHOSTATIC TACHYCARDIA SYNDROME): ICD-10-CM

## 2025-03-19 PROCEDURE — 97113 AQUATIC THERAPY/EXERCISES: CPT | Mod: GP,CQ

## 2025-03-19 NOTE — PROGRESS NOTES
PHYSICAL THERAPY TREATMENT NOTE    Patient Name:  Froylan Hensley   Patient MRN: [id number]  Date: 03/19/25  Time Calculation  Start Time: 0920  Stop Time: 1000  Time Calculation (min): 40 min      Insurance:  Visit number: 5 (updated 03/19/25)   Payor: [facility] PLAN / Plan: [facility] [email or web address] PLAN / Product Type: *No Product type* /    Authorization or Plan of Care date Range: 2/26/26  Needs auth after 7th visit**       Referring Provider: Darin Mayorga   Surgery: No surgery found, No surgery found.  Days since surgery: No surgery found     Therapy diagnoses:   1. POTS (postural orthostatic tachycardia syndrome)  Follow Up In Physical Therapy           General:  Reason for visit: EDS with LE weakness and instability     Assessment:  Patient is a 29 year old with a diagnosis of EDS with LE weakness and instability.   BASELINES: pain, decreased strength, decreased balance/stability, and tenderness   TREATMENT STRATEGIES: LE strengthening, balance, and body mechanics  RESPONSE TO TX: Pt able to complete LE aquatic exercises w/o complaints of pain, but c/o muscle fatigue. Added UE strengthening w/ abdominal bracing. Pt c/o nerve pain down left UE C8-T1 dermatomes. Pt denies increased pain following treatment session.     Plan:  Squats and noodle resistance in water     Precautions:  POTS  Fall Risk: low     Subjective:   Pt states that he is feeling pretty good today and only complains of some muscle soreness.    Pain (0-10): 2-3  HEP adherence / understanding: continuing HEP from prior PT on land.       Objective:  N/A    Treatment Performed: * indicates new exercises for HEP     Therapeutic Exercise:    minutes       Therapeutic Activity:   minutes       Neuromuscular Re-education:   minutes       Gait Training:   minutes      Manual Therapy:   minutes      Modalities:   -Electrical Stimulation           minutes  -Ultrasound            minutes  -Iontophoresis                     minutes  -Cold Pack            minutes  -Mechanical Traction           minutes        Aquatic: 40 minutes   - walking x 3 laps  - side stepping x 2 laps  - marching x 20 alt  - HS curls x 20 ea   - heel/toe raises x 20 ea   - three way hip x 15 ea B    Red step- fwd/lateral x 10 ea B    Deep end   - bicycling x 8 min (fwd & backward)  - skiers x 2 min   - scissor x 2 min

## 2025-03-21 ENCOUNTER — TREATMENT (OUTPATIENT)
Dept: PHYSICAL THERAPY | Facility: CLINIC | Age: 30
End: 2025-03-21
Payer: COMMERCIAL

## 2025-03-21 DIAGNOSIS — G90.A POTS (POSTURAL ORTHOSTATIC TACHYCARDIA SYNDROME): ICD-10-CM

## 2025-03-21 PROCEDURE — 97113 AQUATIC THERAPY/EXERCISES: CPT | Mod: GP,CQ

## 2025-03-21 NOTE — PROGRESS NOTES
PHYSICAL THERAPY TREATMENT NOTE    Patient Name:  Froylan Hensley   Patient MRN: [id number]  Date: 03/21/25  Time Calculation  Start Time: 0850  Stop Time: 0930  Time Calculation (min): 40 min      Insurance:  Visit number: 6 (updated 03/21/25)   Payor: [facility] PLAN / Plan: [facility] PLAN / Product Type: *No Product type* /    Authorization or Plan of Care date Range: 2/26/26  Needs auth after 7th visit**       Referring Provider: Darin Mayorga   Surgery: No surgery found, No surgery found.  Days since surgery: No surgery found     Therapy diagnoses:   1. POTS (postural orthostatic tachycardia syndrome)  Follow Up In Physical Therapy           General:  Reason for visit: EDS with LE weakness and instability     Assessment:  Patient is a 29 year old with a diagnosis of EDS with LE weakness and instability.   BASELINES: pain, decreased strength, decreased balance/stability, and tenderness   TREATMENT STRATEGIES: LE strengthening, balance, and body mechanics  RESPONSE TO TX: Pt challenged w/ exercises on chest height water and c/o difficulty foot and ankle control. Pt displays best tolerance of deep water exercises and reports reduced pain following treatment session. Did not complete step exercises this visit d/t complaints of general pain and fatigue.     Plan:  Squats and noodle resistance in water     Precautions:  POTS  Fall Risk: low     Subjective:   Pt states that he has pain all over today, but complains mostly of leg pain.    Pain (0-10): 2-3  HEP adherence / understanding: continuing HEP from prior PT [place] land.       Objective:  N/A    Treatment Performed: * indicates new exercises for HEP     Therapeutic Exercise:    minutes       Therapeutic Activity:   minutes       Neuromuscular Re-education:   minutes       Gait Training:   minutes      Manual Therapy:   minutes      Modalities:   -Electrical  Stimulation          minutes  -Ultrasound            minutes  -Iontophoresis                     minutes  -Cold Pack            minutes  -Mechanical Traction           minutes        Aquatic: 40 minutes   - walking x 3 laps  - side stepping x 2 laps  - marching x 20 alt    Deep end   - bicycling x 8 min (fwd & backward)  - skiers x 2 min   - scissor x 2 min    Np:  - HS curls x 20 ea   - heel/toe raises x 20 ea   - three way hip x 15 ea B    Red step- fwd/lateral x 10 ea B

## 2025-03-24 ENCOUNTER — TREATMENT (OUTPATIENT)
Dept: PHYSICAL THERAPY | Facility: CLINIC | Age: 30
End: 2025-03-24
Payer: COMMERCIAL

## 2025-03-24 DIAGNOSIS — G90.A POTS (POSTURAL ORTHOSTATIC TACHYCARDIA SYNDROME): ICD-10-CM

## 2025-03-24 PROCEDURE — 97530 THERAPEUTIC ACTIVITIES: CPT | Mod: GP

## 2025-03-24 PROCEDURE — 97110 THERAPEUTIC EXERCISES: CPT | Mod: GP

## 2025-03-24 NOTE — PROGRESS NOTES
Physical Therapy    Physical Therapy Re-Evaluation and Treatment      Patient Name: Froylan Hensley  MRN: [id number]  Today's Date: 3/24/2025  Time Calculation  Start Time: 0915  Stop Time: 0957  Time Calculation (min): 42 min    Insurance - reviewed   Visit number: 7 (updated 03/24/25)   Payor: [facility] / Plan: [facility] / Product Type: *No Product type* /    Needs auth*      Referring Provider: Darin Mayorga   Surgery: No surgery found, No surgery found.  Days since surgery: No surgery found       Assessment:      Froylan Hensley  is a 29 y.o. old patient who participated in a physical therapy re-evaluation today due to LE weakness and instability. 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 Froylan's current impairments include: postural deficits, pain, and decreased strength.   Due to these impairments, he has the following functional limitations and participation restrictions:  increased fall risk, difficulty with stair negotiation, difficulty performing household activities, difficulty performing recreational activities, and increased time to complete ADLs/IADLs. Froylan's clinical presentation is Stable and/or uncomplicated characteristics. Froylan's progressing towards goals but continues to have increased pain. Skilled physical therapy services are still appropriate and beneficial in order to achieve measurable and meaningful change in the objective tests and measures. Continuation of skilled physical therapy services will aid in advancing his functional status and attaining his therapy-related goals. Froylan left session with all questions answered and no increase in symptoms.        Plan:  Continue to progress strengthening on land along with muscular endurance      Planned Interventions: Therapeutic Exercise, Therapeutic Activity, Manual Therapy, Neuromuscular Re-Education, E-stim, Ultrasound, Aquatic Therapy    Frequency and Duration: 1-2x/week for 6 weeks    Plan of Care Agreement: Pt had input in and is agreeable to POC.       Goals:    STG's (within 2 weeks)  Froylan Hensley will decrease pain by >/= 2/10 points from baseline to improve ability to perform household/recreational activities. NOT MET      Froylan Hensley will demonstrate independence,  excellent understanding of and report compliance with at least 3 exercises in his HEP to facilitate the learning process to maximize PT benefits and to facilitate independent rehab program upon discharge. MET      LTG's (by discharge)     Froylan will increase strength to 5/5 to improve ability to perform household/recreational activities. PROGRESSING     Froylan will demonstrate ascending/descending >/=12 steps with alternating pattern and no reports of pain or discomfort. PROGRESSING     Froylan will demonstrate ambulating >/=1000 ft with no reports of pain or discomfort. MOSTLY MET      Froylan Hensley will decrease pain to 0-2/10 to improve ability to perform household/recreational activities. NOT MET      Froylan Hensley will demonstrate independence,  excellent understanding of and report compliance with HEP to facilitate the learning process to maximize PT benefits and to facilitate independent rehab program upon discharge. MET         Current Problem:   Problem List Items Addressed This Visit             ICD-10-CM    POTS (postural orthostatic tachycardia syndrome) G90.A    Relevant Orders    Follow Up In Physical Therapy         Subjective    Subjective:  Pt reports that he continues to have pain and discomfort. States that this is worse in the morning but he feels better in the afternoon. Reports that he feels achy and sore in the back and knees and has a new pain in the L foot. Reports that he no longer has burning pain in the sacral area and has less paresthesias in the feet. Reports that he has not had a fall since starting therapy here. States that he will still feel  unstable and it is hard to crouch down and stand back up.     Functional Limitations: descending stairs, walking long distances, walking on uneven surfaces, squatting, lifting       Pain:  4/10  pain location: low back, BLE's, L foot      Pain description: sharp, achy, sore       Objective Measures:   Objective     Posture: forward head, rounded shoulders     Gait: normal     Squat: knee strategy, decreased depth     Balance: SL stance- 20s B     ROM: (WFL unless otherwise noted)   R hip flexion:  L hip flexion:  R hip abduction:  L hip abduction:  R hip extension:  L hip extension:     R knee flexion:  L knee flexion:  R knee extension:  L knee extension:    R ankle dorsiflexion:  L ankle dorsiflexion:  R ankle plantar flexion:  L ankle plantar flexion:  R ankle inversion:  L ankle inversion:  R ankle eversion:  L ankle eversion:      MMT:  R hip flexion: 5  L hip flexion: 5  R hip abduction: 5  L hip abduction: 5  R hip extension: 5  L hip extension: 5    R knee flexion: 5  L knee flexion: 5  R knee extension: 5  L knee extension: 5    R ankle dorsiflexion: 5  L ankle dorsiflexion: 5  R ankle plantar flexion: 5  L ankle plantar flexion: 5  R ankle inversion: 4+  L ankle inversion: 4+  R ankle eversion: 4+  L ankle eversion: 4+      Tenderness: B thoracic paraspinals extremely tender to touch         Treatments: *indicates new exercises for HEP    Therapeutic Exercise:  15 minutes   - rows green TB x 10   - trial shoulder extensions- pain  - pelvic tilt/core activation 5s hold x 10   - hook lying hip add with ball 5s hold x 10   - hook lying hip abd green TB 5s hold x 10     Therapeutic Activity: 27 minutes   - Objective Measures  - Discussed and reviewed HEP     Neuromuscular Re-education:   minutes       Gait Training:   minutes      Manual Therapy:   minutes      Modalities:   -Electrical Stimulation          minutes  -Ultrasound            minutes  -Iontophoresis                     minutes  -Cold Pack             minutes  -Mechanical Traction           minutes        Aquatic:   minutes          EDUCATION:     -Educated Froylan  on the role of PT and PT POC  -Educated Froylan regarding benefit and purpose of skilled PT services along with results of examination findings and how this correlates to their chief complaint.   -Answered Froylan's questions in full.  -Educated Froylan on relevant anatomy and the rationale for exercises  -Froylan Hensley advised to hold any ex if it increases pain, Froylan Hensley verbalized understanding      Time Calculation  Start Time: 0915  Stop Time: 0957  Time Calculation (min): 42 min     PT Therapeutic Procedures Time Entry  Therapeutic Exercise Time Entry: 15  Therapeutic Activity Time Entry: 27

## 2025-03-26 ENCOUNTER — APPOINTMENT (OUTPATIENT)
Dept: PHYSICAL THERAPY | Facility: CLINIC | Age: 30
End: 2025-03-26
Payer: COMMERCIAL

## 2025-03-26 ENCOUNTER — DOCUMENTATION (OUTPATIENT)
Dept: PHYSICAL THERAPY | Facility: CLINIC | Age: 30
End: 2025-03-26
Payer: COMMERCIAL

## 2025-03-26 NOTE — PROGRESS NOTES
Physical Therapy                 Therapy Communication Note    Patient Name: Froylan Hensley  MRN: [id number]  Department: [facility]  Room: Room/bed info not found  Today's Date: 3/26/2025     Discipline: Physical Therapy          Missed Visit Reason:  Cx due to no authorization yet.     Missed Time: Cancel    Comment: Had to cancel pt's appt due to not receiving auth for appts yet.

## 2025-03-26 NOTE — PROGRESS NOTES
PHYSICAL THERAPY TREATMENT NOTE    Patient Name:  Froylan Hensley   Patient MRN: [id number]  Date: 03/26/25         Insurance:  Visit number: 8 (updated 03/26/25)   Payor: [employer] PLAN / [place] [facility] [place] / Product Type: *No Product type* /    Authorization or Plan of Care date Range: 2/26/26  Needs auth after 7th visit**       Referring Provider: Darin Mayorga   Surgery: No surgery found, No surgery found.  Days since surgery: No surgery found     Therapy diagnoses:   No diagnosis found.       General:  Reason for visit: EDS with LE weakness and instability     Assessment:  Patient is a 29 year old with a diagnosis of EDS with LE weakness and instability.   BASELINES: pain, decreased strength, decreased balance/stability, and tenderness   TREATMENT STRATEGIES: LE strengthening, balance, and body mechanics  RESPONSE TO TX: Pt challenged w/ exercises on chest height water and c/o difficulty foot and ankle control. Pt displays best tolerance of deep water exercises and reports reduced pain following treatment session. Did not complete step exercises this visit d/t complaints of general pain and fatigue.     Plan:  Squats and noodle resistance in water     Precautions:  POTS  Fall Risk: low     Subjective:   Pt states that he has pain all over today, but complains mostly of leg pain.    Pain (0-10): 2-3  HEP adherence / understanding: continuing HEP from prior PT on land.       Objective:  N/A    Treatment Performed: * indicates new exercises for HEP     Therapeutic Exercise:    minutes       Therapeutic Activity:   minutes       Neuromuscular Re-education:   minutes       Gait Training:   minutes      Manual Therapy:   minutes      Modalities:   -Electrical Stimulation          minutes  -Ultrasound            minutes  -Iontophoresis                     minutes  -Cold Pack            minutes  -Mechanical  Traction           minutes        Aquatic:   minutes   - walking x 3 laps  - side stepping x 2 laps  - marching x 20 alt    Deep end   - bicycling x 8 min (fwd & backward)  - skiers x 2 min   - scissor x 2 min    Np:  - HS curls x 20 ea   - heel/toe raises x 20 ea   - three way hip x 15 ea B    Red step- fwd/lateral x 10 ea B

## 2025-03-31 ENCOUNTER — APPOINTMENT (OUTPATIENT)
Dept: PHYSICAL THERAPY | Facility: CLINIC | Age: 30
End: 2025-03-31
Payer: COMMERCIAL

## 2025-04-18 ENCOUNTER — TREATMENT (OUTPATIENT)
Dept: PHYSICAL THERAPY | Facility: CLINIC | Age: 30
End: 2025-04-18
Payer: COMMERCIAL

## 2025-04-18 DIAGNOSIS — G90.A POTS (POSTURAL ORTHOSTATIC TACHYCARDIA SYNDROME): Primary | ICD-10-CM

## 2025-04-18 PROCEDURE — 97110 THERAPEUTIC EXERCISES: CPT | Mod: GP,CQ

## 2025-04-18 NOTE — PROGRESS NOTES
Physical Therapy    Physical Therapy Treatment Note     Patient Name: Froylan Hensley  MRN: [id number]  Today's Date: 4/18/2025  Time Calculation  Start Time: 1110  Stop Time: 1150  Time Calculation (min): 40 min    Insurance - reviewed   Visit number: 8 (updated 04/18/25)   Payor: [facility] PLAN / Plan: [facility] PLAN / Product Type: *No Product type* /    Needs auth*      Referring Provider: Darin Mayorga   Surgery: No surgery found, No surgery found.  Days since surgery: No surgery found       Assessment:   Pt c/o glute and core fatigue during hook-lying strengthening exercises. No complaints of increased pain during or following exercises. Pt reports reduction of general pain at the end of treatment session.       Plan:  Progress general strengthening as tolerated.      Goals:    STG's (within 2 weeks)  Froylan Hensley will decrease pain by >/= 2/10 points from baseline to improve ability to perform household/recreational activities. NOT MET      Froylan Hensley will demonstrate independence,  excellent understanding of and report compliance with at least 3 exercises in his HEP to facilitate the learning process to maximize PT benefits and to facilitate independent rehab program upon discharge. MET      LTG's (by discharge)     Froylan will increase strength to 5/5 to improve ability to perform household/recreational activities. PROGRESSING     Froylan will demonstrate ascending/descending >/=12 steps with alternating pattern and no reports of pain or discomfort. PROGRESSING     Froylan will demonstrate ambulating >/=1000 ft with no reports of pain or discomfort. MOSTLY MET      Froylan Hensley will decrease pain to 0-2/10 to improve ability to perform household/recreational activities. NOT MET      Froylan Hensley will demonstrate independence,  excellent understanding of and report compliance with HEP to facilitate the learning process to maximize PT benefits and to facilitate  independent rehab program upon discharge. MET         Current Problem:   Problem List Items Addressed This Visit           ICD-10-CM    POTS (postural orthostatic tachycardia syndrome) - Primary G90.A           Subjective    Subjective:  Pt states that he has been having a difficult time emotionally lately and he feels like it is affecting his pain. Pt states that his HR has been high, so his doctor has him wearing a halter monitor. Pt reports 2/10 general body pain.     Functional Limitations: descending stairs, walking long distances, walking on uneven surfaces, squatting, lifting       Pain:  2/10  pain location: low back, BLE's, L foot      Pain description: sharp, achy, sore       Objective Measures:   Objective     Posture: forward head, rounded shoulders     Gait: normal     Tenderness: B thoracic paraspinals extremely tender to touch     Treatments: *indicates new exercises for HEP    Therapeutic Exercise:  40 minutes   - pelvic tilt/core activation 5s hold 2 x 10   - hook lying:   hip add with ball 5s hold 2 x 10   hip abd green TB 5s hold 2 x 10   Single knee fallout 2 x 10  B horizontal shoulder abduction 2 x 10, yellow TB  B shoulder ER 2 x 10, yellow TB  Shoulder diagonal flexion 2 x 10 ea, yellow TB  MB lift overhead 2 x 10, 2#  Supine physio ball isometric abdominal crunch 10 x 5 sec hold  Supine physio ball isometric diagonal abdominal crunch 10 x 5 sec hold  Clamshells 2 x 10  Side lying hip abduction 10x ea  S/l hip abduction 10x ea  Supine SLR 2 x 10    Therapeutic Activity:   minutes   - Objective Measures  - Discussed and reviewed HEP     Neuromuscular Re-education:   minutes       Gait Training:   minutes      Manual Therapy:   minutes      Modalities:   -Electrical Stimulation          minutes  -Ultrasound            minutes  -Iontophoresis                     minutes  -Cold Pack            minutes  -Mechanical Traction           minutes        Aquatic:   minutes          EDUCATION:      -Educated Froylan  on the role of PT and PT POC  -Educated Froylan regarding benefit and purpose of skilled PT services along with results of examination findings and how this correlates to their chief complaint.   -Answered Froylan's questions in full.  -Educated Froylan on relevant anatomy and the rationale for exercises  -Froylan Hensley advised to hold any ex if it increases pain, Froylan Hensley verbalized understanding      Time Calculation  Start Time: 1110  Stop Time: 1150  Time Calculation (min): 40 min     PT Therapeutic Procedures Time Entry  Therapeutic Exercise Time Entry: 40

## 2025-04-21 ENCOUNTER — APPOINTMENT (OUTPATIENT)
Dept: PHYSICAL THERAPY | Facility: CLINIC | Age: 30
End: 2025-04-21
Payer: COMMERCIAL

## 2025-04-23 ENCOUNTER — TREATMENT (OUTPATIENT)
Dept: PHYSICAL THERAPY | Facility: CLINIC | Age: 30
End: 2025-04-23
Payer: COMMERCIAL

## 2025-04-23 DIAGNOSIS — G90.A POTS (POSTURAL ORTHOSTATIC TACHYCARDIA SYNDROME): ICD-10-CM

## 2025-04-23 PROCEDURE — 97110 THERAPEUTIC EXERCISES: CPT | Mod: GP,CQ

## 2025-04-23 NOTE — PROGRESS NOTES
Physical Therapy    Physical Therapy Treatment Note     Patient Name: Froylan Hensley  MRN: [id number]  Today's Date: 4/23/2025  Time Calculation  Start Time: 0440  Stop Time: 0525  Time Calculation (min): 45 min    Insurance - reviewed   Visit number: 9 (updated 04/23/25)   Payor: [facility] PLAN / Plan: [facility] PLAN / Product Type: *No Product type* /    Needs auth*      Referring Provider: Darin Mayorga   Surgery: No surgery found, No surgery found.  Days since surgery: No surgery found       Assessment:   Pt displays improved glute medius strength and endurance while performing side lying hip abduction. Pt displays less fatigue while complete exercises this visit, compared to previous visit. No c/o increased pain following treatment session.     Plan:  Progress general strengthening as tolerated.      Goals:    STG's (within 2 weeks)  Froylan Hensley will decrease pain by >/= 2/10 points from baseline to improve ability to perform household/recreational activities. NOT MET      Froylan Hensley will demonstrate independence,  excellent understanding of and report compliance with at least 3 exercises in his HEP to facilitate the learning process to maximize PT benefits and to facilitate independent rehab program upon discharge. MET      LTG's (by discharge)     Froylan will increase strength to 5/5 to improve ability to perform household/recreational activities. PROGRESSING     Froylan will demonstrate ascending/descending >/=12 steps with alternating pattern and no reports of pain or discomfort. PROGRESSING     Froylan will demonstrate ambulating >/=1000 ft with no reports of pain or discomfort. MOSTLY MET      Froylan Hensley will decrease pain to 0-2/10 to improve ability to perform household/recreational activities. NOT MET      Froylan Hensley will demonstrate independence,  excellent understanding of and report compliance with HEP to facilitate the learning process to maximize PT  benefits and to facilitate independent rehab program upon discharge. MET         Current Problem:   Problem List Items Addressed This Visit           ICD-10-CM    POTS (postural orthostatic tachycardia syndrome) G90.A           Subjective    Subjective:  Pt states that his wrists are sore today, but he was doing some hand sanding earlier. Pt reports having anterior knee pain when walking down stairs.     Functional Limitations: descending stairs, walking long distances, walking on uneven surfaces, squatting, lifting       Pain:  2/10  pain location: B wrists & knees     Pain description: sharp, achy, sore       Objective Measures:   Objective     Posture: forward head, rounded shoulders     Gait: normal     Tenderness: B thoracic paraspinals extremely tender to touch     Treatments: *indicates new exercises for HEP    Therapeutic Exercise:  45 minutes   - pelvic tilt/core activation 5s hold 2 x 10   - hook lying:   hip add with ball 5s hold 2 x 10   hip abd green TB 5s hold 2 x 10   Single knee fallout 2 x 10, green TB  B horizontal shoulder abduction 2 x 10, yellow TB  B shoulder ER 2 x 10, yellow TB  Shoulder diagonal flexion 2 x 10 ea, yellow TB  MB lift overhead 2 x 10, 2#   Supine physio ball isometric abdominal crunch 10 x 5 sec hold  Supine physio ball isometric diagonal abdominal crunch 10 x 5 sec hold  Clamshells 2 x 10: np  Side lying hip abduction 2 x 10 ea  Supine SLR 2 x 10: np    Therapeutic Activity:   minutes   - Objective Measures  - Discussed and reviewed HEP     Neuromuscular Re-education:   minutes       Gait Training:   minutes      Manual Therapy:   minutes      Modalities:   -Electrical Stimulation          minutes  -Ultrasound            minutes  -Iontophoresis                     minutes  -Cold Pack            minutes  -Mechanical Traction           minutes        Aquatic:   minutes          EDUCATION:     -Educated Froylan  on the role of PT and PT POC  -Educated Froylan regarding benefit  and purpose of skilled PT services along with results of examination findings and how this correlates to their chief complaint.   -Answered Froylan's questions in full.  -Educated Froylan on relevant anatomy and the rationale for exercises  -Froylan Hensley advised to hold any ex if it increases pain, Froylan Hensley verbalized understanding      Time Calculation  Start Time: 0440  Stop Time: 0525  Time Calculation (min): 45 min     PT Therapeutic Procedures Time Entry  Therapeutic Exercise Time Entry: 45

## 2025-05-02 ENCOUNTER — TREATMENT (OUTPATIENT)
Dept: PHYSICAL THERAPY | Facility: CLINIC | Age: 30
End: 2025-05-02
Payer: COMMERCIAL

## 2025-05-02 DIAGNOSIS — G90.A POTS (POSTURAL ORTHOSTATIC TACHYCARDIA SYNDROME): ICD-10-CM

## 2025-05-02 PROCEDURE — 97110 THERAPEUTIC EXERCISES: CPT | Mod: GP

## 2025-05-02 PROCEDURE — 97530 THERAPEUTIC ACTIVITIES: CPT | Mod: GP

## 2025-05-02 NOTE — PROGRESS NOTES
Physical Therapy    Physical Therapy Treatment Note     Patient Name: Froylan Hensley  MRN: [id number]  Today's Date: 5/2/2025  Time Calculation  Start Time: 1100  Stop Time: 1140  Time Calculation (min): 40 min    Insurance - reviewed   Visit number: 10 (updated 05/02/25)   Payor: [facility] PLAN / Plan: [facility] / Product Type: *No Product type* /    Auth 11 visits 3/24-5/16    Referring Provider: Darin Mayorga   Surgery: No surgery found, No surgery found.  Days since surgery: No surgery found       Assessment:   Tolerated tx well. No reports of pain or discomfort with any exercises on this date. Some hip soreness noted with fall outs. Requires cueing for core activation in hook lying and in standing with shoulder extensions. Left the session with no increase in pain.     Plan:  Progress general strengthening as tolerated.          Current Problem:   Problem List Items Addressed This Visit           ICD-10-CM    POTS (postural orthostatic tachycardia syndrome) G90.A           Subjective    Subjective:  Pt states that he is sore in his wrists and hips today. States that he was on a trip last week and felt good walking on hills in his ankles and felt stable. [facility] [employer] and this irritated his elbow and shoulder.       Pain:  2/10  pain location: B wrists & knees     Pain description: sharp, achy, sore       Objective Measures:   Objective     Posture: forward head, rounded shoulders     Tenderness: B thoracic paraspinals extremely tender to touch       Treatments: *indicates new exercises for HEP    Therapeutic Exercise:  32 minutes   - NuStep x 10 min L5  - pelvic tilt/core activation 5s hold 2 x 10   - hook lying:   hip add with ball 5s hold 2 x 10   hip abd green TB 5s hold 2 x 10   Single knee fallout 2 x 10, green TB  B horizontal shoulder abduction 2 x 10, red TB  - half kneeling hip flexor stretch x 30s B  - shoulder extensions red TB 2x10 with core activation   -  trial 5# KB overhead with alt marching- pain       NP:  B shoulder ER 2 x 10, yellow TB  Shoulder diagonal flexion 2 x 10 ea, yellow TB  MB lift overhead 2 x 10, 2#   Supine physio ball isometric abdominal crunch 10 x 5 sec hold  Supine physio ball isometric diagonal abdominal crunch 10 x 5 sec hold  Clamshells 2 x 10  Side lying hip abduction 2 x 10 ea  Supine SLR 2 x 10      Therapeutic Activity: 8 minutes   - Discussed importance of back and core strength in regards to postural endurance   - Discussed that it is appropriate to gently stretch a few times a week     Neuromuscular Re-education:   minutes       Gait Training:   minutes      Manual Therapy:   minutes      Modalities:   -Electrical Stimulation          minutes  -Ultrasound            minutes  -Iontophoresis                     minutes  -Cold Pack            minutes  -Mechanical Traction           minutes        Aquatic:   minutes       Time Calculation  Start Time: 1100  Stop Time: 1140  Time Calculation (min): 40 min     PT Therapeutic Procedures Time Entry  Therapeutic Exercise Time Entry: 32  Therapeutic Activity Time Entry: 8

## 2025-05-05 ENCOUNTER — TREATMENT (OUTPATIENT)
Dept: PHYSICAL THERAPY | Facility: CLINIC | Age: 30
End: 2025-05-05
Payer: COMMERCIAL

## 2025-05-05 DIAGNOSIS — G90.A POTS (POSTURAL ORTHOSTATIC TACHYCARDIA SYNDROME): ICD-10-CM

## 2025-05-05 PROCEDURE — 97110 THERAPEUTIC EXERCISES: CPT | Mod: GP

## 2025-05-05 NOTE — PROGRESS NOTES
Physical Therapy    Physical Therapy Treatment Note     Patient Name: Froylan Hensley  MRN: [id number]  Today's Date: 5/5/2025  Time Calculation  Start Time: 0330  Stop Time: 0411  Time Calculation (min): 41 min    Insurance - reviewed   Visit number: 11 (updated 05/05/25)   Payor: [facility] PLAN / Plan: [facility] PLAN / Product Type: *No Product type* /    Auth 11 visits 3/24-5/16    Referring Provider: Darin Mayorga   Surgery: No surgery found, No surgery found.  Days since surgery: No surgery found       Assessment:   Trial of three way hip with yellow TB- pt unable to tolerate and had to stop due to RLE pain. Requires verbal and visual cues for shoulder extensions with core activation. Continues to tolerate table exercises more than standing. Able to increase resistance today with table exercises.     Plan:  Progress general strengthening as tolerated.          Current Problem:   Problem List Items Addressed This Visit           ICD-10-CM    POTS (postural orthostatic tachycardia syndrome) G90.A           Subjective    Subjective:  Pt states that he has been mostly pain free since last visit. Reports that he has random cramping on the outside of his thigh but it comes and goes.       Pain:  2/10  pain location: hips, RLE     Pain description: sharp, achy, sore       Objective Measures:   Objective     Posture: forward head, rounded shoulders     Tenderness: B thoracic paraspinals extremely tender to touch       Treatments: *indicates new exercises for HEP    Therapeutic Exercise:  41 minutes   - NuStep x 10 min L3  - shoulder extensions red TB 2x10 with core activation   - trial standing three way hip with yellow TB- pt stopped due to pain   - pelvic tilt/core activation 5s hold 2 x 10   - hook lying:   hip add with ball 5s hold 2 x 10   hip abd green TB 5s hold 2 x 10   - Clamshells 2 x 10 yellow TB   - Side lying hip abduction 2 x 10 ea  - Supine SLR 2 x 10  - supine 5# KB  overhead with alt marching      NP:  B shoulder ER 2 x 10, yellow TB  Shoulder diagonal flexion 2 x 10 ea, yellow TB  MB lift overhead 2 x 10, 2#   Supine physio ball isometric abdominal crunch 10 x 5 sec hold  Supine physio ball isometric diagonal abdominal crunch 10 x 5 sec hold  Single knee fallout 2 x 10, green TB  B horizontal shoulder abduction 2 x 10, red TB  - half kneeling hip flexor stretch x 30s B      Therapeutic Activity:   minutes       Neuromuscular Re-education:   minutes       Gait Training:   minutes      Manual Therapy:   minutes      Modalities:   -Electrical Stimulation          minutes  -Ultrasound            minutes  -Iontophoresis                     minutes  -Cold Pack            minutes  -Mechanical Traction           minutes        Aquatic:   minutes       Time Calculation  Start Time: 0330  Stop Time: 0411  Time Calculation (min): 41 min     PT Therapeutic Procedures Time Entry  Therapeutic Exercise Time Entry: 41

## 2025-05-07 ENCOUNTER — TREATMENT (OUTPATIENT)
Dept: PHYSICAL THERAPY | Facility: CLINIC | Age: 30
End: 2025-05-07
Payer: COMMERCIAL

## 2025-05-07 DIAGNOSIS — G90.A POTS (POSTURAL ORTHOSTATIC TACHYCARDIA SYNDROME): ICD-10-CM

## 2025-05-07 PROCEDURE — 97110 THERAPEUTIC EXERCISES: CPT | Mod: GP

## 2025-05-07 NOTE — PROGRESS NOTES
Physical Therapy    Physical Therapy Treatment Note     Patient Name: Froylan Hensley  MRN: [id number]  Today's Date: 5/7/2025  Time Calculation  Start Time: 0328  Stop Time: 0406  Time Calculation (min): 38 min    Insurance - reviewed   Visit number: 12 (updated 05/07/25)   Payor: [facility] PLAN / Plan: [facility] PLAN / Product Type: *No Product type* /    Auth 11 visits 3/24-5/16    Referring Provider: Darin Mayorga   Surgery: No surgery found, No surgery found.  Days since surgery: No surgery found       Assessment:   Focused on wrist and forearm strengthening today due to pain and weakness here. Tolerated exercises well with light weight. Difficulty with not locking up the elbow joint during exercises but self corrected throughout session.      Plan:  Progress general strengthening as tolerated.          Current Problem:   Problem List Items Addressed This Visit           ICD-10-CM    POTS (postural orthostatic tachycardia syndrome) G90.A           Subjective    Subjective:  Pt states that his LE feels more stable and he feels stronger here. Is concerned with his amount of strength and stability in the elbows and wrists. Has a hard time lifting and carrying objects and also with gripping.       Pain:  2/10  pain location: B elbows and wrists      Pain description: achy, sore       Objective Measures:   Objective     Posture: forward head, rounded shoulders     Tenderness: B thoracic paraspinals extremely tender to touch       Treatments: *indicates new exercises for HEP    Therapeutic Exercise:  38 minutes   - NuStep x 10 min L3  - seated wrist flex/ext 1# 2x10 B  - seated wrist pronation/supination 1# x 20 ea   - seated flex  twisting x 20 B  - seated flex  shoulder add 2x10  - seated flex  shoulder abd 2x10   - rows blue TB 2x10  - shoulder extensions green TB 2x10     NP:  B shoulder ER 2 x 10, yellow TB  Shoulder diagonal flexion 2 x 10 ea, yellow TB  MB lift  overhead 2 x 10, 2#   Supine physio ball isometric abdominal crunch 10 x 5 sec hold  Supine physio ball isometric diagonal abdominal crunch 10 x 5 sec hold  Single knee fallout 2 x 10, green TB  B horizontal shoulder abduction 2 x 10, red TB  - half kneeling hip flexor stretch x 30s B  - trial standing three way hip with yellow TB- pt stopped due to pain   - pelvic tilt/core activation 5s hold 2 x 10   - hook lying:   hip add with ball 5s hold 2 x 10   hip abd green TB 5s hold 2 x 10   - Clamshells 2 x 10 yellow TB   - Side lying hip abduction 2 x 10 ea  - Supine SLR 2 x 10  - supine 5# KB overhead with alt marching      Therapeutic Activity:   minutes       Neuromuscular Re-education:   minutes       Gait Training:   minutes      Manual Therapy:   minutes      Modalities:   -Electrical Stimulation          minutes  -Ultrasound            minutes  -Iontophoresis                     minutes  -Cold Pack            minutes  -Mechanical Traction           minutes        Aquatic:   minutes       Time Calculation  Start Time: 0328  Stop Time: 0406  Time Calculation (min): 38 min     PT Therapeutic Procedures Time Entry  Therapeutic Exercise Time Entry: 38

## 2025-05-12 ENCOUNTER — TREATMENT (OUTPATIENT)
Dept: PHYSICAL THERAPY | Facility: CLINIC | Age: 30
End: 2025-05-12
Payer: COMMERCIAL

## 2025-05-12 DIAGNOSIS — G90.A POTS (POSTURAL ORTHOSTATIC TACHYCARDIA SYNDROME): ICD-10-CM

## 2025-05-12 PROCEDURE — 97110 THERAPEUTIC EXERCISES: CPT | Mod: GP,CQ

## 2025-05-12 NOTE — PROGRESS NOTES
"Physical Therapy    Physical Therapy Treatment Note     Patient Name: Froylan Hensley  MRN: [id number]  Today's Date: 5/12/2025  Time Calculation  Start Time: 0145  Stop Time: 0235  Time Calculation (min): 50 min    Insurance - reviewed   Visit number: 13 (updated 05/12/25)   Payor: [facility] PLAN / Plan: [facility] PLAN / Product Type: *No Product type* /    Auth 11 visits 3/24-5/16    Referring Provider: Darin Mayorga   Surgery: No surgery found, No surgery found.  Days since surgery: No surgery found       Assessment:   Able to reduce c/o hip discomfort following hip flexor and hip adductor stretching. Pt able to complete 4\" step exercises w/o complaints of pain, but had to hold onto bar to maintain balance. Pt challenged w/ rubber band thumb extension exercise. No c/o increased pain following treatment session.     Plan:  Progress general strengthening as tolerated.          Current Problem:   Problem List Items Addressed This Visit           ICD-10-CM    POTS (postural orthostatic tachycardia syndrome) G90.A           Subjective    Subjective:  Pt states that he is still having some hip soreness. Pt describes B anterior and lateral hip pain. Pt states that his wrists have been hurting and he has pain when pushing up out of the bed and standing up out of a chair.     Pain:  3/10  pain location: B elbows and wrists      Pain description: achy, sore       Objective Measures:   Objective     Posture: forward head, rounded shoulders     Tenderness: B thoracic paraspinals extremely tender to touch       Treatments: *indicates new exercises for HEP    Therapeutic Exercise:  50 minutes   - NuStep x 10 min L3  - supine hip flexor stretch off of EOB 2 x 30 sec hold  - standing adductor stretch 2 x 30 sec hold  - 4\" forward step-ups 2 x 10 ea  - 4\" lateral step-up 2 x 10 ea  - 4\" forward step dips 10x ea  - seated wrist flex/ext 1# 2x10 B  - ball squeeze, pushing thumb into ball   - clip " squeeze 2 x 10  - rubber band thumb extension 10x ea  - squeezing and placing clips on thera tubing 2 x 5 clips ea  - seated wrist pronation/supination 1# x 20 ea     NP:  - seated flex  twisting x 20 B  - seated flex  shoulder add 2x10  - seated flex  shoulder abd 2x10   - rows blue TB 2x10  - shoulder extensions green TB 2x10     B shoulder ER 2 x 10, yellow TB  Shoulder diagonal flexion 2 x 10 ea, yellow TB  MB lift overhead 2 x 10, 2#   Supine physio ball isometric abdominal crunch 10 x 5 sec hold  Supine physio ball isometric diagonal abdominal crunch 10 x 5 sec hold  Single knee fallout 2 x 10, green TB  B horizontal shoulder abduction 2 x 10, red TB  - half kneeling hip flexor stretch x 30s B  - trial standing three way hip with yellow TB- pt stopped due to pain   - pelvic tilt/core activation 5s hold 2 x 10   - hook lying:   hip add with ball 5s hold 2 x 10   hip abd green TB 5s hold 2 x 10   - Clamshells 2 x 10 yellow TB   - Side lying hip abduction 2 x 10 ea  - Supine SLR 2 x 10  - supine 5# KB overhead with alt marching      Therapeutic Activity:   minutes       Neuromuscular Re-education:   minutes       Gait Training:   minutes      Manual Therapy:   minutes      Modalities:   -Electrical Stimulation          minutes  -Ultrasound            minutes  -Iontophoresis                     minutes  -Cold Pack            minutes  -Mechanical Traction           minutes        Aquatic:   minutes       Time Calculation  Start Time: 0145  Stop Time: 0235  Time Calculation (min): 50 min     PT Therapeutic Procedures Time Entry  Therapeutic Exercise Time Entry: 50

## 2025-05-14 ENCOUNTER — TREATMENT (OUTPATIENT)
Dept: PHYSICAL THERAPY | Facility: CLINIC | Age: 30
End: 2025-05-14
Payer: COMMERCIAL

## 2025-05-14 DIAGNOSIS — G90.A POTS (POSTURAL ORTHOSTATIC TACHYCARDIA SYNDROME): Primary | ICD-10-CM

## 2025-05-14 PROCEDURE — 97530 THERAPEUTIC ACTIVITIES: CPT | Mod: GP

## 2025-05-14 NOTE — PROGRESS NOTES
Physical Therapy    Physical Therapy Re-Evaluation and Treatment      Patient Name: Froylan Hensley  MRN: [id number]  Today's Date: 5/14/2025  Time Calculation  Start Time: 0245  Stop Time: 0315  Time Calculation (min): 30 min    Insurance - reviewed   Visit number: 14 (updated 05/14/25)   Payor: [facility] PLAN / Plan: [facility] PLAN / Product Type: *No Product type* /    Last authorized visit     Referring Provider: Darin Mayorga   Surgery: No surgery found, No surgery found.  Days since surgery: No surgery found       Assessment:      Froylan Hensley  is a 29 y.o. old patient who participated in a physical therapy re-evaluation today due to Le weakness and instability. Froylan presents with signs and symptoms consistent with hypermobility with LE weakness and instability. Froylan's current impairments include: postural deficits and pain.   Due to these impairments, he has the following functional limitations and participation restrictions: difficulty performing household activities and difficulty performing recreational activities. Froylan's clinical presentation is Stable and/or uncomplicated characteristics. Froylan's mostly met all goals.  Skilled physical therapy services are no longer appropriate at this time. Pt has made improvements in strength, balance/stability, pain levels, and confidence in functional ability. Pt instructed to continue with HEP and progress as tolerated. Pt left the session with all questions answered.       Plan:  DC from PT 5/14/25        Goals:    STG's (within 2 weeks)  Froylan Hensley will decrease pain by >/= 2/10 points from baseline to improve ability to perform household/recreational activities. MET      Froylan Hensley will demonstrate independence,  excellent understanding of and report compliance with at least 3 exercises in his HEP to facilitate the learning process to maximize PT benefits and to facilitate independent rehab program upon discharge.  MET      LTG's (by discharge)     Froylan will increase strength to 5/5 to improve ability to perform household/recreational activities. MET      Froylan will demonstrate ascending/descending >/=12 steps with alternating pattern and no reports of pain or discomfort. PROGRESSING     Froylan will demonstrate ambulating >/=1000 ft with no reports of pain or discomfort. MOSTLY MET      Froylan Hensley will decrease pain to 0-2/10 to improve ability to perform household/recreational activities. MOSTLY MET      Froylan Hensley will demonstrate independence,  excellent understanding of and report compliance with HEP to facilitate the learning process to maximize PT benefits and to facilitate independent rehab program upon discharge. MET       Current Problem:   Problem List Items Addressed This Visit           ICD-10-CM    POTS (postural orthostatic tachycardia syndrome) - Primary G90.A         Subjective    Subjective:  Pt reports that he has a lot of outside factors that are affecting his physical condition at this point. Is under a lot of stress and has other medical tests coming up that he is anxious about. Reports that his ankle and L foot feel better and he feels more stable. Continues to have a hard time crouching down and standing back up. Back pain is better but pops up intermittently. Reports that he is more aware of his limitations and is more careful with functional and recreational activities to avoid instability and pain.     Functional Limitations: descending stairs, squatting, lifting, walking on uneven surfaces       Pain:  1/10  pain location: low back, BLE's     Pain description: achy, sore       Objective Measures:   Objective     Posture: mild forward head, rounded shoulders     Gait: normal     Squat: knee strategy     Balance: SL stance- 20s B     ROM: (WFL unless otherwise noted)   R hip flexion:  L hip flexion:  R hip abduction:  L hip abduction:  R hip extension:  L hip extension:     R knee  flexion:  L knee flexion:  R knee extension:  L knee extension:    R ankle dorsiflexion:  L ankle dorsiflexion:  R ankle plantar flexion:  L ankle plantar flexion:  R ankle inversion:  L ankle inversion:  R ankle eversion:  L ankle eversion:      MMT:  R hip flexion: 5  L hip flexion: 5  R hip abduction: 5  L hip abduction: 5  R hip extension: 5  L hip extension: 5    R knee flexion: 5  L knee flexion: 5  R knee extension: 5  L knee extension: 5    R ankle dorsiflexion: 5  L ankle dorsiflexion: 5  R ankle plantar flexion: 5  L ankle plantar flexion: 5  R ankle inversion: 5  L ankle inversion: 5  R ankle eversion: 5  L ankle eversion: 5      Outcome Measures:  Other Measures  Lower Extremity Funtional Score (LEFS): 53       Treatments: *indicates new exercises for HEP    Therapeutic Exercise:    minutes       Therapeutic Activity: 30 minutes   - Objective Measures  - Discussed and reviewed HEP and the importance of continuing this 3-4x/week     Neuromuscular Re-education:   minutes       Gait Training:   minutes      Manual Therapy:   minutes      Modalities:   -Electrical Stimulation          minutes  -Ultrasound            minutes  -Iontophoresis                     minutes  -Cold Pack            minutes  -Mechanical Traction           minutes        Aquatic:   minutes          EDUCATION:     -Educated Froylan  on the role of PT and PT POC  -Educated Froylan regarding benefit and purpose of skilled PT services along with results of examination findings and how this correlates to their chief complaint.   -Answered Froylan's questions in full.  -Educated Froylan on relevant anatomy and the rationale for exercises  -Froylan Hensley advised to hold any ex if it increases pain, Froylan Hensley verbalized understanding      Time Calculation  Start Time: 0245  Stop Time: 0315  Time Calculation (min): 30 min     PT Therapeutic Procedures Time Entry  Therapeutic Activity Time Entry: 30

## 2025-05-28 ENCOUNTER — APPOINTMENT (OUTPATIENT)
Dept: RADIOLOGY | Facility: HOSPITAL | Age: 30
End: 2025-05-28
Payer: COMMERCIAL

## 2025-05-28 ENCOUNTER — APPOINTMENT (OUTPATIENT)
Dept: CARDIOLOGY | Facility: HOSPITAL | Age: 30
End: 2025-05-28
Payer: COMMERCIAL

## 2025-05-28 ENCOUNTER — HOSPITAL ENCOUNTER (EMERGENCY)
Facility: HOSPITAL | Age: 30
Discharge: HOME | End: 2025-05-29
Attending: STUDENT IN AN ORGANIZED HEALTH CARE EDUCATION/TRAINING PROGRAM
Payer: COMMERCIAL

## 2025-05-28 DIAGNOSIS — R07.9 CHEST PAIN, UNSPECIFIED TYPE: Primary | ICD-10-CM

## 2025-05-28 LAB
ALBUMIN SERPL BCP-MCNC: 4.3 G/DL (ref 3.4–5)
ALP SERPL-CCNC: 61 U/L (ref 33–120)
ALT SERPL W P-5'-P-CCNC: 33 U/L (ref 10–52)
ANION GAP SERPL CALC-SCNC: 10 MMOL/L (ref 10–20)
AST SERPL W P-5'-P-CCNC: 22 U/L (ref 9–39)
BASOPHILS # BLD AUTO: 0.09 X10*3/UL (ref 0–0.1)
BASOPHILS NFR BLD AUTO: 1.1 %
BILIRUB SERPL-MCNC: 0.4 MG/DL (ref 0–1.2)
BNP SERPL-MCNC: 29 PG/ML (ref 0–99)
BUN SERPL-MCNC: 16 MG/DL (ref 6–23)
CALCIUM SERPL-MCNC: 9.6 MG/DL (ref 8.6–10.3)
CARDIAC TROPONIN I PNL SERPL HS: 3 NG/L (ref 0–20)
CARDIAC TROPONIN I PNL SERPL HS: 4 NG/L (ref 0–20)
CHLORIDE SERPL-SCNC: 106 MMOL/L (ref 98–107)
CO2 SERPL-SCNC: 26 MMOL/L (ref 21–32)
CREAT SERPL-MCNC: 1.09 MG/DL (ref 0.5–1.3)
EGFRCR SERPLBLD CKD-EPI 2021: >90 ML/MIN/1.73M*2
EOSINOPHIL # BLD AUTO: 0.17 X10*3/UL (ref 0–0.7)
EOSINOPHIL NFR BLD AUTO: 2.2 %
ERYTHROCYTE [DISTWIDTH] IN BLOOD BY AUTOMATED COUNT: 12 % (ref 11.5–14.5)
GLUCOSE SERPL-MCNC: 107 MG/DL (ref 74–99)
HCT VFR BLD AUTO: 42.6 % (ref 41–52)
HGB BLD-MCNC: 14.3 G/DL (ref 13.5–17.5)
IMM GRANULOCYTES # BLD AUTO: 0.02 X10*3/UL (ref 0–0.7)
IMM GRANULOCYTES NFR BLD AUTO: 0.3 % (ref 0–0.9)
LYMPHOCYTES # BLD AUTO: 1.68 X10*3/UL (ref 1.2–4.8)
LYMPHOCYTES NFR BLD AUTO: 21.5 %
MAGNESIUM SERPL-MCNC: 2.04 MG/DL (ref 1.6–2.4)
MCH RBC QN AUTO: 30 PG (ref 26–34)
MCHC RBC AUTO-ENTMCNC: 33.6 G/DL (ref 32–36)
MCV RBC AUTO: 89 FL (ref 80–100)
MONOCYTES # BLD AUTO: 0.62 X10*3/UL (ref 0.1–1)
MONOCYTES NFR BLD AUTO: 7.9 %
NEUTROPHILS # BLD AUTO: 5.25 X10*3/UL (ref 1.2–7.7)
NEUTROPHILS NFR BLD AUTO: 67 %
NRBC BLD-RTO: 0 /100 WBCS (ref 0–0)
PLATELET # BLD AUTO: 315 X10*3/UL (ref 150–450)
POTASSIUM SERPL-SCNC: 4.3 MMOL/L (ref 3.5–5.3)
PROT SERPL-MCNC: 7.4 G/DL (ref 6.4–8.2)
RBC # BLD AUTO: 4.77 X10*6/UL (ref 4.5–5.9)
SODIUM SERPL-SCNC: 138 MMOL/L (ref 136–145)
WBC # BLD AUTO: 7.8 X10*3/UL (ref 4.4–11.3)

## 2025-05-28 PROCEDURE — 71046 X-RAY EXAM CHEST 2 VIEWS: CPT

## 2025-05-28 PROCEDURE — 75635 CT ANGIO ABDOMINAL ARTERIES: CPT

## 2025-05-28 PROCEDURE — 71275 CT ANGIOGRAPHY CHEST: CPT

## 2025-05-28 PROCEDURE — 99285 EMERGENCY DEPT VISIT HI MDM: CPT | Mod: 25 | Performed by: STUDENT IN AN ORGANIZED HEALTH CARE EDUCATION/TRAINING PROGRAM

## 2025-05-28 PROCEDURE — 83880 ASSAY OF NATRIURETIC PEPTIDE: CPT

## 2025-05-28 PROCEDURE — 93005 ELECTROCARDIOGRAM TRACING: CPT

## 2025-05-28 PROCEDURE — 2550000001 HC RX 255 CONTRASTS

## 2025-05-28 PROCEDURE — 2550000001 HC RX 255 CONTRASTS: Performed by: STUDENT IN AN ORGANIZED HEALTH CARE EDUCATION/TRAINING PROGRAM

## 2025-05-28 PROCEDURE — 84484 ASSAY OF TROPONIN QUANT: CPT

## 2025-05-28 PROCEDURE — 75635 CT ANGIO ABDOMINAL ARTERIES: CPT | Performed by: STUDENT IN AN ORGANIZED HEALTH CARE EDUCATION/TRAINING PROGRAM

## 2025-05-28 PROCEDURE — 36415 COLL VENOUS BLD VENIPUNCTURE: CPT

## 2025-05-28 PROCEDURE — 2550000001 HC RX 255 CONTRASTS: Performed by: EMERGENCY MEDICINE

## 2025-05-28 PROCEDURE — 83735 ASSAY OF MAGNESIUM: CPT

## 2025-05-28 PROCEDURE — 80053 COMPREHEN METABOLIC PANEL: CPT

## 2025-05-28 PROCEDURE — 71046 X-RAY EXAM CHEST 2 VIEWS: CPT | Performed by: RADIOLOGY

## 2025-05-28 PROCEDURE — 85025 COMPLETE CBC W/AUTO DIFF WBC: CPT

## 2025-05-28 PROCEDURE — 71275 CT ANGIOGRAPHY CHEST: CPT | Performed by: RADIOLOGY

## 2025-05-28 RX ADMIN — IOHEXOL 75 ML: 350 INJECTION, SOLUTION INTRAVENOUS at 22:20

## 2025-05-28 RX ADMIN — IOHEXOL 50 ML: 350 INJECTION, SOLUTION INTRAVENOUS at 19:35

## 2025-05-28 RX ADMIN — IOHEXOL 125 ML: 350 INJECTION, SOLUTION INTRAVENOUS at 18:22

## 2025-05-28 ASSESSMENT — LIFESTYLE VARIABLES
EVER HAD A DRINK FIRST THING IN THE MORNING TO STEADY YOUR NERVES TO GET RID OF A HANGOVER: NO
TOTAL SCORE: 0
HAVE PEOPLE ANNOYED YOU BY CRITICIZING YOUR DRINKING: NO
EVER FELT BAD OR GUILTY ABOUT YOUR DRINKING: NO
HAVE YOU EVER FELT YOU SHOULD CUT DOWN ON YOUR DRINKING: NO

## 2025-05-28 ASSESSMENT — PAIN DESCRIPTION - DESCRIPTORS
DESCRIPTORS: TIGHTNESS
DESCRIPTORS: STABBING;BURNING

## 2025-05-28 ASSESSMENT — PAIN - FUNCTIONAL ASSESSMENT: PAIN_FUNCTIONAL_ASSESSMENT: 0-10

## 2025-05-28 ASSESSMENT — PAIN DESCRIPTION - LOCATION: LOCATION: CHEST

## 2025-05-28 ASSESSMENT — PAIN SCALES - GENERAL: PAINLEVEL_OUTOF10: 5 - MODERATE PAIN

## 2025-05-28 NOTE — ED TRIAGE NOTES
The patient was seen and examined in triage.    History of Present Illness: The patient is a 29-year-old male presenting to the emergency department due to chest pain.  Patient reports 3 days of constant midsternal chest pain but experiences intermittent episodes of sharp stabbing pain in the center of his chest.  Denies fevers, chills, lightheadedness, dizziness, cough/cold symptoms, shortness of breath, nausea, or vomiting.  His cardiologist Dr. 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    Brief Physical Exam:  Exam is limited by the patient sitting in a chair in triage.   Heart: Regular rate and rhythm.   Lungs: Clear to auscultation bilaterally.   Abdomen: Soft, nondistended, nontender     Plan: Appropriate labs and diagnostic imaging were ordered.      For the remainder of the patient's workup and ED course, please refer to the main ED provider note. We discussed need for diagnostic testing including laboratory studies and imaging.  We also discussed that they may be asked to wait in the waiting room while these tests are pending.  They understand that if they choose to leave without having the testing completed or resulted that we cannot rule out acute life threatening illnesses and the risks involved could lead to worsening condition, permanent disability or even death.      Disclaimer: This note was dictated by speech recognition. Minor errors in transcription may be present. Please call if questions.

## 2025-05-28 NOTE — ED PROVIDER NOTES
Emergency Department Provider Note       History of Present Illness     History provided by: Patient  Limitations to History: None  External Records Reviewed with Brief Summary: EMR records    HPI:  Froylan Hensley is a 29 y.o. male with past medical history of POTS and Grant-Danlos disease comes into the emergency department complaining of intermittent chest pain for the last 3 to 4 days.  Chest pain has been referred to be central without any radiation.  He visited his cardiologist yesterday who wanted him to come over to the emergency department for further evaluation and rule out of any aortic disease due to his past medical history.  Patient denies any exacerbating or triggering factors besides palpation.  He denies any shortness of breath, cough, congestion, recent travel, DVT/PE history, leg swelling/pain, sick contacts, abdominal pain, nausea, vomiting, diarrhea or any other complaints.      Physical Exam   Triage vitals:  T 36.6 °C (97.9 °F)  HR 70  /78  RR 18  O2 100 % None (Room air)    Physical Exam  Vitals and nursing note reviewed.   Constitutional:       General: He is not in acute distress.     Appearance: Normal appearance. He is not toxic-appearing.   HENT:      Head: Atraumatic.      Nose: Nose normal.      Mouth/Throat:      Mouth: Mucous membranes are moist.   Eyes:      Extraocular Movements: Extraocular movements intact.      Conjunctiva/sclera: Conjunctivae normal.   Cardiovascular:      Rate and Rhythm: Normal rate and regular rhythm.      Pulses: Normal pulses.   Pulmonary:      Effort: Pulmonary effort is normal.      Breath sounds: Normal breath sounds.   Chest:   Breasts:     Left: No nipple discharge.      Comments: Central sternal chest wall tenderness with no rash or crepitus.  Abdominal:      General: Abdomen is flat. Bowel sounds are normal.      Palpations: Abdomen is soft.   Musculoskeletal:         General: No swelling or deformity. Normal range of motion.       Cervical back: Normal range of motion and neck supple. No rigidity.   Skin:     General: Skin is warm.      Capillary Refill: Capillary refill takes less than 2 seconds.   Neurological:      General: No focal deficit present.      Mental Status: He is alert. Mental status is at baseline.   Psychiatric:         Mood and Affect: Mood normal.       Labs Reviewed   COMPREHENSIVE METABOLIC PANEL - Abnormal       Result Value    Glucose 107 (*)     Sodium 138      Potassium 4.3      Chloride 106      Bicarbonate 26      Anion Gap 10      Urea Nitrogen 16      Creatinine 1.09      eGFR >90      Calcium 9.6      Albumin 4.3      Alkaline Phosphatase 61      Total Protein 7.4      AST 22      Bilirubin, Total 0.4      ALT 33     MAGNESIUM - Normal    Magnesium 2.04     B-TYPE NATRIURETIC PEPTIDE - Normal    BNP 29      Narrative:        <100 pg/mL - Heart failure unlikely  100-299 pg/mL - Intermediate probability of acute heart                  failure exacerbation. Correlate with clinical                  context and patient history.    >=300 pg/mL - Heart Failure likely. Correlate with clinical                  context and patient history.    BNP testing is performed using different testing methodology at [facility] than at other [facility]. Direct result comparisons should only be made within the same method.      SERIAL TROPONIN-INITIAL - Normal    Troponin I, High Sensitivity 3      Narrative:     Less than 99th percentile of normal range cutoff-  Female and children under 18 years old <14 ng/L; Male <21 ng/L: Negative  Repeat testing should be performed if clinically indicated.     Female and children under 18 years old 14-50 ng/L; Male 21-50 ng/L:  Consistent with possible cardiac damage and possible increased clinical   risk. Serial measurements may help to assess extent of myocardial damage.     >50 ng/L: Consistent with cardiac damage, increased clinical risk and  myocardial infarction. Serial  measurements may help assess extent of   myocardial damage.      NOTE: Children less than 1 year old may have higher baseline troponin   levels and results should be interpreted in conjunction with the overall   clinical context.     NOTE: Troponin I testing is performed using a different   testing methodology at [facility] than at other   [facility]. Direct result comparisons should only   be made within the same method.   SERIAL TROPONIN, 1 HOUR - Normal    Troponin I, High Sensitivity 4      Narrative:     Less than 99th percentile of normal range cutoff-  Female and children under 18 years old <14 ng/L; Male <21 ng/L: Negative  Repeat testing should be performed if clinically indicated.     Female and children under 18 years old 14-50 ng/L; Male 21-50 ng/L:  Consistent with possible cardiac damage and possible increased clinical   risk. Serial measurements may help to assess extent of myocardial damage.     >50 ng/L: Consistent with cardiac damage, increased clinical risk and  myocardial infarction. Serial measurements may help assess extent of   myocardial damage.      NOTE: Children less than 1 year old may have higher baseline troponin   levels and results should be interpreted in conjunction with the overall   clinical context.     NOTE: Troponin I testing is performed using a different   testing methodology at [facility] than at other   [facility]. Direct result comparisons should only   be made within the same method.   CBC WITH AUTO DIFFERENTIAL    WBC 7.8      nRBC 0.0      RBC 4.77      Hemoglobin 14.3      Hematocrit 42.6      MCV 89      MCH 30.0      MCHC 33.6      RDW 12.0      Platelets 315      Neutrophils % 67.0      Immature Granulocytes %, Automated 0.3      Lymphocytes % 21.5      Monocytes % 7.9      Eosinophils % 2.2      Basophils % 1.1      Neutrophils Absolute 5.25      Immature Granulocytes Absolute, Automated 0.02      Lymphocytes Absolute 1.68       Monocytes Absolute 0.62      Eosinophils Absolute 0.17      Basophils Absolute 0.09     TROPONIN SERIES- (INITIAL, 1 HR)    Narrative:     The following orders were created for panel order Troponin I Series, High Sensitivity (0, 1 HR).  Procedure                               Abnormality         Status                     ---------                               -----------         ------                     Troponin I, High Sensiti...[[id number]]  Normal              Final result               Troponin, High Sensitivi...[[id number]]  Normal              Final result                 Please view results for these tests on the individual orders.        CT angio chest w and wo IV contrast   Final Result   Linear hypodensity along anterior aspect of the ascending aorta   extending 3 cm cranially from the sinotubular junction. Morphology   would suggest a dissection flap however, there is notable surrounding   motion related to cardiac motion and the finding may be artifactual,   with  aortic diameter 2.9 cm and no hematoma. Recommend consultation   with vascular surgery and possibly repeat scan with cardiac gated CTA.        MACRO:        Ildefonso Ace discussed the significance and urgency of this critical   finding by telephone with  IVONE TORRES on 5/28/2025 at 8:02 pm.   (**-RCF-**) Findings:  Acute aortic syndrome.                       Signed by: Ildefonso Ace 5/28/2025 8:04 PM   Dictation workstation:   [id number]      CT angio aorta and bilateral iliofemoral runoff including without contrast if performed   Final Result   Normal CTA with two-vessel runoff bilaterally. No high-grade   stenosis, occlusion, dissection, or aneurysm identified.        Please note, the thoracic aorta is predominantly outside the field of   view. If clinical concern persists, a chest CTA can be considered for   better evaluation of the thoracic vasculature.        MACRO:   None        Signed by: Paul Bazan 5/28/2025 7:12 PM    Dictation workstation:   [id number]      XR chest 2 views   Final Result   1.  No evidence of acute cardiopulmonary process.             Signed by: Garrett Hernandez 5/28/2025 5:46 PM   Dictation workstation:   [id number]      CT angio chest abdomen pelvis    (Results Pending)        Medical Decision Making & ED Course     ----      Differential diagnoses considered include but are not limited to: ACS, arrhythmia, aortic dissection, PE, costochondritis, electrolyte imbalance, pneumonia, pneumothorax, pleural effusion, others.    Social Determinants of Health which Significantly Impact Care: Social Determinants of Health which Significantly Impact Care: None identified     EKG Independent Interpretation: EKG interpreted by myself. Please see ED Course for full interpretation.    Independent Result Review and Interpretation: Relevant laboratory and radiographic results were reviewed and independently interpreted by myself.  As necessary, they are commented on in the ED Course.    Chronic conditions affecting the patient's care: As documented above in [facility]    The patient was discussed with the following consultants/services:DR     Care Considerations: As documented above in [facility]    ED Course:  ED Course as of 05/28/25 2054   Wed May 28, 2025   1838 EKG Completed.  Ventricular rate of 66.  No priors widening.  No axis deviation.  No STEMI. [AM]   1849 Patient does not want any medications at the moment in the emergency department. [AM]   1912 Patient started with malaise prior to me upon arrival to the emergency department in triage.  Patient is missing the CT angio of the chest in order to completely visualize the thoracic aorta.  Study has been added. [AM]   2005 Consult to cardiothoracic surgeon has been placed.  Patient was updated regarding the results on the CT angio of the chest that were abnormal.  Radiologist contacted me to notify me of the results.  Patient is in no acute distress and hemodynamically stable.   He agreed with the plan of care and treatment in the emergency department so far.  Troponins are negative x 2, BNP is not elevated.  CBC and chemistry within normal limits. [AM]   2014 Repeat EKG completed.  Ventricular rate of 55.  Sinus bradycardia.  No QRS widening.  Normal CA interval.  No STEMI. [AM]   2036 Dr Huber vascular surgery called back. Case was discussed. He requested cardiac surgery to be on the line. Dr Ruiz cardiac surgery joined the line. Case was discussed.  They reviewed the imaging. Recommendation is to do a  Gated CTA chest/abdomen/pelvis and to call them back with results. If CTA is normal no need to transfer him but if abnormal he may need transfer.  [AM]   2052 Requested by the specialist having already read.  I has personally talked to the CT tech in order to make sure that the orders are placed properly and that they performed the study as gated. [AM]   2053 ED care will be transitioned to Dr Villegas pending CTA gated and contacting vascular/cardiothoracic team again.  Patient has been updated and he agreed with plan of care and treatment in the emergency department. [AM]      ED Course User Index  [AM] Anabelle Us MD       Disposition   Patient was signed out to Nelly at 9:00PM pending completion of their work-up.  Please see the next provider's transition of care note for the remainder of the patient's care.     Procedures   Procedures        Anabelle Us MD  Emergency Medicine                                                            Anabelle Us MD  05/28/25 2054

## 2025-05-28 NOTE — ED TRIAGE NOTES
"PT CAME IN C/O CP X \"COUPLE DAYS AGO\". Pt state that his cardiologist to come in if it gets worse. Pt rates pain 5-7/10. Pt denies sob or abdominal pain. Pt has hx of pots and other autoimmune diseases.   "

## 2025-05-29 VITALS
DIASTOLIC BLOOD PRESSURE: 78 MMHG | WEIGHT: 210 LBS | BODY MASS INDEX: 31.83 KG/M2 | TEMPERATURE: 98.6 F | SYSTOLIC BLOOD PRESSURE: 122 MMHG | OXYGEN SATURATION: 100 % | HEART RATE: 70 BPM | RESPIRATION RATE: 18 BRPM | HEIGHT: 68 IN

## 2025-05-29 LAB
ATRIAL RATE: 55 BPM
ATRIAL RATE: 66 BPM
P AXIS: 52 DEGREES
P AXIS: 76 DEGREES
P OFFSET: 195 MS
P OFFSET: 203 MS
P ONSET: 149 MS
P ONSET: 164 MS
PR INTERVAL: 110 MS
PR INTERVAL: 138 MS
Q ONSET: 218 MS
Q ONSET: 219 MS
QRS COUNT: 11 BEATS
QRS COUNT: 9 BEATS
QRS DURATION: 74 MS
QRS DURATION: 78 MS
QT INTERVAL: 376 MS
QT INTERVAL: 420 MS
QTC CALCULATION(BAZETT): 394 MS
QTC CALCULATION(BAZETT): 401 MS
QTC FREDERICIA: 388 MS
QTC FREDERICIA: 408 MS
R AXIS: 38 DEGREES
R AXIS: 67 DEGREES
T AXIS: 26 DEGREES
T AXIS: 76 DEGREES
T OFFSET: 407 MS
T OFFSET: 428 MS
VENTRICULAR RATE: 55 BPM
VENTRICULAR RATE: 66 BPM

## 2025-05-29 ASSESSMENT — PAIN SCALES - GENERAL: PAINLEVEL_OUTOF10: 0 - NO PAIN

## 2025-05-29 NOTE — DISCHARGE INSTRUCTIONS
Please follow-up with your primary care provider in 2 to 3 days.  Return to the emergency department if you develop any worsening chest pain, shortness of breath, weakness, numbness, lightheadedness, dizziness or if concerns arise peer

## 2025-05-29 NOTE — ED PROVIDER NOTES
Patient signed out to me pending CT angiography of the chest, abdomen pelvis gated study for a concern of dissection.  Will review conference with cardiothoracic surgery team as well and vascular surgery team to discuss the results prior to disposition.    Spoke to Dr. Jimenez and discussed the results of the CT chest, abdomen pelvis and they were all read and he is in agreement that the patient will be discharged home.  Patient will follow-up with his primary care provider and given instruction to return to the ED if alarming symptoms arise.     Willard Villegas,   05/29/25 0041

## 2025-06-02 NOTE — PROGRESS NOTES
Assessment and Plan    05/14/2025 CT head without contrast, which I personally reviewed, shows no lesion.    This 29 year-old man with a history of Grant-Danlos type 3, dysautomonia, obesity, HLD presents for evaluation of diplopia.    He seems to have monocular diplopia, not binocular diplopia that would be a neuro-ophthalmology problem. I recommend that he treat dry eye and keep a log of diplopia.    Plan    Artificial tears   Keep a log of double vision events covering each eye individually to determine whether the double is in each individual eye or only when looking through both eyes together.    Follow up as needed. (Dilated 6/3/2025)

## 2025-06-03 ENCOUNTER — APPOINTMENT (OUTPATIENT)
Dept: OPHTHALMOLOGY | Facility: CLINIC | Age: 30
End: 2025-06-03
Payer: COMMERCIAL

## 2025-06-03 DIAGNOSIS — H53.2 MONOCULAR DIPLOPIA: Primary | ICD-10-CM

## 2025-06-03 PROCEDURE — 99214 OFFICE O/P EST MOD 30 MIN: CPT | Performed by: PSYCHIATRY & NEUROLOGY

## 2025-06-03 PROCEDURE — 92060 SENSORIMOTOR EXAMINATION: CPT | Performed by: PSYCHIATRY & NEUROLOGY

## 2025-06-03 RX ORDER — MIDODRINE HYDROCHLORIDE 2.5 MG/1
2.5 TABLET ORAL 3 TIMES DAILY
COMMUNITY
Start: 2025-05-27 | End: 2025-06-26

## 2025-06-03 ASSESSMENT — TONOMETRY
OD_IOP_MMHG: 14
IOP_METHOD: GOLDMANN APPLANATION
OS_IOP_MMHG: 14

## 2025-06-03 ASSESSMENT — ENCOUNTER SYMPTOMS
MUSCULOSKELETAL NEGATIVE: 0
HEMATOLOGIC/LYMPHATIC NEGATIVE: 0
CONSTITUTIONAL NEGATIVE: 0
PSYCHIATRIC NEGATIVE: 0
EYES NEGATIVE: 1
ALLERGIC/IMMUNOLOGIC NEGATIVE: 0
NEUROLOGICAL NEGATIVE: 0
CARDIOVASCULAR NEGATIVE: 0
RESPIRATORY NEGATIVE: 0
GASTROINTESTINAL NEGATIVE: 0
ENDOCRINE NEGATIVE: 0

## 2025-06-03 ASSESSMENT — CONF VISUAL FIELD
OD_INFERIOR_NASAL_RESTRICTION: 0
OD_SUPERIOR_NASAL_RESTRICTION: 0
OD_NORMAL: 1
OS_SUPERIOR_TEMPORAL_RESTRICTION: 0
OD_INFERIOR_TEMPORAL_RESTRICTION: 0
OD_SUPERIOR_TEMPORAL_RESTRICTION: 0
OS_INFERIOR_NASAL_RESTRICTION: 0
OS_INFERIOR_TEMPORAL_RESTRICTION: 0
OS_SUPERIOR_NASAL_RESTRICTION: 0
OS_NORMAL: 1

## 2025-06-03 ASSESSMENT — EXTERNAL EXAM - LEFT EYE: OS_EXAM: NORMAL

## 2025-06-03 ASSESSMENT — LEVATOR FUNCTION
OS_LEVATOR: 19
OD_LEVATOR: 17

## 2025-06-03 ASSESSMENT — CUP TO DISC RATIO
OD_RATIO: 0.2
OS_RATIO: 0.2

## 2025-06-03 ASSESSMENT — SLIT LAMP EXAM - LIDS
COMMENTS: PTOSIS
COMMENTS: NORMAL

## 2025-06-03 ASSESSMENT — MARGIN REFLEX DISTANCE
OD_MRD1: 2
OS_MRD1: 4

## 2025-06-03 ASSESSMENT — VISUAL ACUITY
OS_CC: 20/20
METHOD: SNELLEN - LINEAR
OD_CC: 20/20

## 2025-06-03 ASSESSMENT — EXTERNAL EXAM - RIGHT EYE: OD_EXAM: NORMAL

## 2025-06-03 NOTE — LETTER
Shirley 3, 2025     Moni Valdez MD   [address]  [address]  [facility] [address]    Patient: Froylan Hensley   YOB: 1995   Date of Visit: 6/3/2025     Dear Dr. Moni Valdez MD:    I am writing to share my findings regarding our shared patient Froylan Hensley from his visit with me on 6/3/2025.    HPI    This 29 year-old man with a history of Grant-Danlos type 3, dysautomonia, obesity, HLD presents for evaluation of diplopia. He saw ophthalmologist Dr. Ivan Valdez 2/6/2025 with referral to me for diplopia after a head injury falling down stairs 5/2024.  -- Attending history below --  This 29 year-old man with a history of Grant-Danlos type 3, dysautomonia, obesity, HLD presents for evaluation of diplopia.    He saw ophthalmologist Dr. Ivan Valdez 2/6/2025 with referral to me for diplopia after a head injury falling down stairs 5/2024.    The diplopia onset was with the fall. This diplopia can be vertical or horizontal. He describes sometimes more a smear of letters running together. The problem is constant. He reports diplopia persists with monocular right eye viewing. He cannot tell a dependence on gaze direction.    The right ptosis seems more longstanding, but worse after the fall.    Sometimes reading, he skips down to the wrong line.  Last edited by Froylan Mccoy MD PhD on 6/3/2025  9:18 AM.        Diagnoses    Diagnoses and all orders for this visit:  Monocular diplopia (Primary)    Assessment and Plan    05/14/2025 CT head without contrast, which I personally reviewed, shows no lesion.    This 29 year-old man with a history of Grant-Danlos type 3, dysautomonia, obesity, HLD presents for evaluation of diplopia.    He seems to have monocular diplopia, not binocular diplopia that would be a neuro-ophthalmology problem. I recommend that he treat dry eye and keep a log of diplopia.    Plan    Artificial tears   Keep a log of double vision events covering each eye individually to determine  whether the double is in each individual eye or only when looking through both eyes together.    Follow up as needed. (Dilated 6/3/2025)      Below you will find my full examination. I appreciate the opportunity to see Froylan Hensley today and to share in his care with you. Please contact me if you have questions for me regarding this visit or if I can be of assistance to another of your patients with neuro-ophthalmological problems.    Sincerely,    Froylan Mccoy MD PhD    CC:   No Recipients      Base Eye Exam       Visual Acuity (Snellen - Linear)         Right Left    Dist cc 20/20 20/20              Tonometry (Goldmann Applanation, 8:45 AM)         Right Left    Pressure 14 14              Pupils         Dark Light Shape React APD    Right 5 3 Round Brisk None    Left 5 3 Round Brisk None              Visual Fields         Left Right     Full Full              Extraocular Movement         Right Left     Full Full              Neuro/Psych       Oriented x3: Yes    Mood/Affect: Normal                  Additional Tests       Color         Right Left    Ish[place]ara 11/11 11/11              Stereo       Fly: +    Animals: 3/3    Circles: 8/9              Double [place] T[place]         Right Left    Primary  no torsion  no torsion                  Strabismus Exam       Reading #1   (Edited by: Froylan Mccoy MD PhD)      Method: Near with glasses      Distance Near Near +3DS N Bifocals                      - - - - - -   - - - - - -                       - -  - -  Ortho  - -  - -                       - - - - - -   - - - - - -                           Reading #2   (Edited by: Froylan Mccoy MD PhD)      Method: [facility] room with glasses      Distance Near Near +3DS N Bifocals                      - - - - - -  Ortho  - - - - - -                      Ortho  - -  - -  Ortho  - -  - -  Ortho                      - - - - - -  Ortho  - - - - - -                           Comments    Monocular right diplopia improves  with pinhole.                   Slit Lamp and Fundus Exam       External Exam         Right Left    External Normal Normal    MRD1 2 mm 4 mm    Levator 17 mm 19 mm              Slit Lamp Exam         Right Left    Lids/Lashes Ptosis Normal    Conjunctiva/Sclera White and quiet White and quiet    Cornea Clear Clear    Anterior Chamber Deep and quiet Deep and quiet    Iris Round and reactive Round and reactive    Lens Clear Clear    Anterior Vitreous Normal Normal              Fundus Exam         Right Left    Posterior Vitreous Normal Normal    Disc Normal Normal    C/D Ratio 0.2 0.2    Macula Normal Normal    Vessels Normal Normal    Periphery Normal Normal

## 2025-07-16 ENCOUNTER — OFFICE VISIT (OUTPATIENT)
Dept: ORTHOPEDIC SURGERY | Facility: CLINIC | Age: 30
End: 2025-07-16
Payer: COMMERCIAL

## 2025-07-16 ENCOUNTER — HOSPITAL ENCOUNTER (OUTPATIENT)
Dept: RADIOLOGY | Facility: CLINIC | Age: 30
Discharge: HOME | End: 2025-07-16
Payer: COMMERCIAL

## 2025-07-16 DIAGNOSIS — M25.561 ACUTE PAIN OF RIGHT KNEE: ICD-10-CM

## 2025-07-16 DIAGNOSIS — S83.411A SPRAIN OF MEDIAL COLLATERAL LIGAMENT OF RIGHT KNEE, INITIAL ENCOUNTER: Primary | ICD-10-CM

## 2025-07-16 PROCEDURE — 1036F TOBACCO NON-USER: CPT | Performed by: FAMILY MEDICINE

## 2025-07-16 PROCEDURE — 73562 X-RAY EXAM OF KNEE 3: CPT | Mod: RT

## 2025-07-16 PROCEDURE — 99212 OFFICE O/P EST SF 10 MIN: CPT | Performed by: FAMILY MEDICINE

## 2025-07-16 PROCEDURE — 99203 OFFICE O/P NEW LOW 30 MIN: CPT | Performed by: FAMILY MEDICINE

## 2025-07-16 NOTE — PROGRESS NOTES
History of Present Illness   Chief Complaint   Patient presents with    Right Knee - Injury     WALK IN-        The patient is 29 y.o. male  here with a complaint of right knee pain.  Patient injured himself 1 week ago, says he was stepping over a dog barrier when he slipped, describes a valgus type knee injury, did not directly strike his knee or twist his knee notably.  He has been having ongoing pain over the medial aspect of the knee since.  Pain exacerbated by walking, some pain at rest.  He does take meloxicam for some chronic pain secondary to Grant-Danlos syndrome, has not been helpful for his knee pain.  He denies any locking or catching, feel slightly unstable with weightbearing.  No significant focal knee problems in the past.  He does have some diffuse body pain from his Grant-Danlos, follows with PCP for this.  He is contemplating going on disability because of his chronic pain.    Medical History[1]    Medication Documentation Review Audit       Reviewed by Froylan Mccoy MD PhD (Physician) on 06/03/25 at 0914      Medication Order Taking? Sig Documenting Provider Last Dose Status   albuterol 90 mcg/actuation inhaler [id number]  Inhale 4 times a day. Historical Provider, MD  Active   atorvastatin (Lipitor) 10 mg tablet [id number] Yes Take 1 tablet (10 mg) by mouth once daily. Historical Provider, MD  Active   buPROPion XL (Wellbutrin XL) 300 mg 24 hr tablet [id number] Yes Take 1 tablet (300 mg) by mouth once daily. Historical Provider, MD  Active   ergocalciferol (Vitamin D-2) 1.25 MG (08802 UT) capsule [id number] Yes Take by mouth. Historical Provider, MD  Active   gabapentin (Neurontin) 300 mg capsule [id number] Yes Take by mouth. Historical Provider, MD  Active   lamoTRIgine (LaMICtal) 150 mg tablet [id number] Yes Take 1 tablet (150 mg) by mouth once daily. Historical Provider, MD  Active   meloxicam (Mobic) 15 mg tablet [id number] Yes Take by mouth. Historical Provider, MD  Active   meloxicam (Mobic) 7.5  mg tablet [id number]  Take by mouth.   Patient not taking: Reported on 6/3/2025    Historical Provider, MD  Active   midodrine (Proamatine) 2.5 mg tablet [id number] Yes Take 1 tablet (2.5 mg) by mouth 3 times a day. Historical Provider, MD  Active   nadolol (Corgard) 20 mg tablet [id number] Yes Take 1 tablet (20 mg) by mouth once daily. Historical Provider, MD  Active   PARoxetine (Paxil) 40 mg tablet [id number] Yes Take 1 tablet (40 mg) by mouth once daily. Historical Provider, MD  Active                    RX Allergies[2]    Social History     Socioeconomic History    Marital status: Single     Spouse name: Not on file    Number of children: Not on file    Years of education: Not on file    Highest education level: Not on file   Occupational History    Not on file   Tobacco Use    Smoking status: Never    Smokeless tobacco: Never   Substance and Sexual Activity    Alcohol use: Never    Drug use: Never    Sexual activity: Defer   Other Topics Concern    Not on file   Social History Narrative    Not on file     Social Drivers of Health     Financial Resource Strain: Not on file   Food Insecurity: Not on file   Transportation Needs: Not on file   Physical Activity: Not on file   Stress: Not on file   Social Connections: Not on file   Intimate Partner Violence: Not on file   Housing Stability: Not on file       Surgical History[3]       Review of Systems   GENERAL: Negative  GI: Negative  MUSCULOSKELETAL: See HPI  SKIN: Negative  NEURO:  Negative     Physical Exam:    General/Constitutional: well appearing, no distress, appears stated age  HEENT: sclera clear  Respiratory: non labored breathing  Vascular: No edema, swelling or tenderness, except as noted in detailed exam.  Integumentary: No impressive skin lesions present, except as noted in detailed exam.  Neurological:  Alert and oriented   Psychological:  Normal mood and affect.  Musculoskeletal: Normal, except as noted in detailed exam and in HPI.  Antalgic gait,  unassisted    Right knee: Normal appearance, no swelling or skin changes.  He has some diffuse, generalized tenderness palpation at the knee, there is pain along the MCL medially.  He has full range of motion from 0 to 130 degrees, there is pain throughout arc of range of motion.  No motor deficits present, he has pain with resisted knee flexion and extension.  No gross ligamentous laxity is present.  There is pain but no laxity with valgus stress at 0 and 30 degrees of flexion.  Negative Lachman, negative posterior drawer       Imaging: X-rays of right knee obtained today and independently reviewed, no acute abnormalities are seen, no degenerative changes, no joint effusion, unremarkable knee radiographs      Assessment   1. Sprain of medial collateral ligament of right knee, initial encounter  Referral to Physical Therapy      2. Acute pain of right knee  XR knee right 3 views            Plan: Right knee pain, mechanism of injury consistent with MCL sprain, pain but no laxity with MCL stress test, there is some more generalized discomfort, he does have diffuse body wide pain from Grant-Danlos which is complicates diagnosis to some extent.  Recommended conservative management.  Recommend over-the-counter compressive knee brace for support, I did give him some home exercises as well as a formal physical therapy referral that he will consider.  He will plan to follow-up as needed.       [1]   Past Medical History:  Diagnosis Date    Bipolar disorder, unspecified ([facility])     Bipolar disease, chronic    Grant-Danlos syndrome, unspecified ([facility])     Grant-Danlos disease    Familial dysautonomia (lenny-day)     Dysautonomia    Postural orthostatic tachycardia syndrome (POTS)     POTS (postural orthostatic tachycardia syndrome)   [2]   Allergies  Allergen Reactions    Atenolol Unknown    Amoxicillin Rash   [3]   Past Surgical History:  Procedure Laterality Date    OTHER SURGICAL HISTORY  05/05/2022    No history of  surgery

## 2025-07-30 ENCOUNTER — EVALUATION (OUTPATIENT)
Dept: PHYSICAL THERAPY | Facility: CLINIC | Age: 30
End: 2025-07-30
Payer: COMMERCIAL

## 2025-07-30 DIAGNOSIS — S83.411A SPRAIN OF MEDIAL COLLATERAL LIGAMENT OF RIGHT KNEE, INITIAL ENCOUNTER: ICD-10-CM

## 2025-07-30 DIAGNOSIS — S83.411A SPRAIN OF MEDIAL COLLATERAL LIGAMENT OF RIGHT KNEE: Primary | ICD-10-CM

## 2025-07-30 PROCEDURE — 97161 PT EVAL LOW COMPLEX 20 MIN: CPT | Mod: GP

## 2025-07-30 PROCEDURE — 97110 THERAPEUTIC EXERCISES: CPT | Mod: GP

## 2025-07-30 ASSESSMENT — ENCOUNTER SYMPTOMS
OCCASIONAL FEELINGS OF UNSTEADINESS: 1
LOSS OF SENSATION IN FEET: 0
DEPRESSION: 0

## 2025-07-30 ASSESSMENT — COLUMBIA-SUICIDE SEVERITY RATING SCALE - C-SSRS
2. HAVE YOU ACTUALLY HAD ANY THOUGHTS OF KILLING YOURSELF?: NO
6. HAVE YOU EVER DONE ANYTHING, STARTED TO DO ANYTHING, OR PREPARED TO DO ANYTHING TO END YOUR LIFE?: NO
1. IN THE PAST MONTH, HAVE YOU WISHED YOU WERE DEAD OR WISHED YOU COULD GO TO SLEEP AND NOT WAKE UP?: NO

## 2025-07-30 ASSESSMENT — PATIENT HEALTH QUESTIONNAIRE - PHQ9
2. FEELING DOWN, DEPRESSED OR HOPELESS: NOT AT ALL
SUM OF ALL RESPONSES TO PHQ9 QUESTIONS 1 AND 2: 0
1. LITTLE INTEREST OR PLEASURE IN DOING THINGS: NOT AT ALL

## 2025-07-30 NOTE — PROGRESS NOTES
Physical Therapy    Physical Therapy Evaluation    Patient Name: Froylan Hensley  MRN: [id number]  Today's Date: 7/30/2025  Time Calculation  Start Time: 0448  Stop Time: 0520  Time Calculation (min): 32 min     Problem List Items Addressed This Visit           ICD-10-CM    Sprain of medial collateral ligament of right knee - Primary S83.411A    Relevant Orders    Follow Up In Physical Therapy         Insurance:  Visit number: 1   Insurance Type: Payor: [employer] / Plan: [employer] / Product Type: *No Product type* /   Authorization or Plan of Care date Range: needs auth       General:  Reason for visit: R knee pain    Referred by: BAILEY Wyatt MD appt:  not scheduled    Surgery No surgery found, No surgery found. Days since surgery: No surgery found      Precautions:  POTS, EDS   Fall Risk: Low       Assessment:   Pt presents with R knee pain, potential MCL or meniscus injury. Pt will benefit from skilled PT services to address strength, pain, balance/stability, and overall function. Pt given HEP today and left the session with all questions answered.     Clinical Presentation: Stable and/or uncomplicated characteristics    Impairments: Pain, Strength, Balance, Fall risk, and Decreased functional level    Functional Limitations: Stair negotiation, Driving, Walking > 15 minutes, and Standing > 15 minutes    Recommended Treatment:  Therapeutic exercise, Manual therapy, Home program instruction and progression, Neuromuscular re-education, Therapeutic activities, Self care and home management, Instruction in activity modification, Electrical stimulation, Vasopneumatic device + cold, and Aquatic therapy      Plan:  Plan of care was developed with input and agreement by the patient.  1x/week for 6 weeks       Rehab Potential:   Good to achieve goals.      Goals:  STG's (within 2 weeks)  Froylan Hensley will decrease pain by >/= 2/10 points from baseline to improve ability to perform  household/recreational activities.    Froylan Hensley will demonstrate independence,  excellent understanding of and report compliance with at least 3 exercises in his HEP to facilitate the learning process to maximize PT benefits and to facilitate independent rehab program upon discharge.    LTG's (by discharge)    Froylan will increase strength to 5/5 to improve ability to perform household/recreational activities.     Froylan will demonstrate ambulating >/=1000 ft with no gait deviation and no reports of pain or discomfort.      Froylan will demonstrate ascending/descending >/=10 steps with alternating pattern and no reports of pain or discomfort.     Froylan Hensley will decrease pain to 0-2/10 to improve ability to perform household/recreational activities.    Froylan Hensley will demonstrate independence,  excellent understanding of and report compliance with HEP to facilitate the learning process to maximize PT benefits and to facilitate independent rehab program upon discharge.      Subjective:  CC:  Pt reports that in early July he was stepping over a dog barrier and got his foot caught which caused his knee valgus stress. Has continued pain here. Reports that he feels good in the morning but when he is on his feet for a while walking he will feel more unstable and have more discomfort. Denies clicking, popping, grinding but confirms that the knee gives away on him occasionally since this injury. Reports that he has been wearing a compression sleeve and this helps with instability and pain.   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   EXERCISE: previous PT exercises, walking   Patient Stated Goal: alleviate pain and restore function      Medical History Form: Reviewed (scanned into chart)      Objective:   Objective     Gait: antalgic     Squat: knee strategy, decreased depth     Balance: 20s on L SL, 5s on R SL     ROM: (WFL unless otherwise noted)   R hip flexion:  L hip flexion:  R hip abduction:  L hip abduction:  R hip extension:  L hip extension:     R knee flexion:  L knee flexion:  R knee extension:  L knee extension:    R ankle dorsiflexion:  L ankle dorsiflexion:      MMT:  R hip flexion: 4+  L hip flexion: 5  R hip abduction: unable to perform   L hip abduction: 5  R hip extension: NT  L hip extension: NT     R knee flexion: 5  L knee flexion: 5  R knee extension: 4+  L knee extension: 5    R ankle dorsiflexion: 5  L ankle dorsiflexion: 5      Special Tests: Mcmurrays (+) on R, thessaly (?) on R, valgus and varus stress (-) on R      Tenderness: medial joint line, posterior R knee     Joint Play: patellae normal B       Outcome Measures:  Other Measures  Lower Extremity Funtional Score (LEFS): 32       Treatment Performed: (\"NP\" = Not Performed)     Therapeutic Exercise:     8 minutes  Home exercise program instructed and issued.  QS   SLR  HL hip add  HL hip abd green TB  LAQ  HS stretch  Heel raise     Manual Therapy:       minutes      Neuromuscular Re-education:      minutes      Gait Training:            minutes      Aquatics:            minutes      Therapeutic Activity:      minutes      Modalities:       Vasopneumatic Device       minutes  Electrical Stimulation          minutes  Ultrasound            minutes  Iontophoresis                     minutes  Cold Pack            minutes  Mechanical Traction           minutes    Self Care Home Management:    minutes    Sofia " Reposition:          minutes     Education:          minutes    Other:       minutes      Evaluation Complexity: Low: 24 minutes; Moderate   minutes; Complex PT Evaluation Time Entry: 24;  minutes    Re-Evaluation:   minutes

## 2025-08-14 ENCOUNTER — TREATMENT (OUTPATIENT)
Dept: PHYSICAL THERAPY | Facility: CLINIC | Age: 30
End: 2025-08-14
Payer: COMMERCIAL

## 2025-08-14 DIAGNOSIS — S83.411A SPRAIN OF MEDIAL COLLATERAL LIGAMENT OF RIGHT KNEE: ICD-10-CM

## 2025-08-14 PROCEDURE — 97035 APP MDLTY 1+ULTRASOUND EA 15: CPT | Mod: GP,CQ

## 2025-08-14 PROCEDURE — 97110 THERAPEUTIC EXERCISES: CPT | Mod: GP,CQ

## 2025-08-20 ENCOUNTER — TREATMENT (OUTPATIENT)
Dept: PHYSICAL THERAPY | Facility: CLINIC | Age: 30
End: 2025-08-20
Payer: COMMERCIAL

## 2025-08-20 DIAGNOSIS — S83.411A SPRAIN OF MEDIAL COLLATERAL LIGAMENT OF RIGHT KNEE: ICD-10-CM

## 2025-08-20 PROCEDURE — 97035 APP MDLTY 1+ULTRASOUND EA 15: CPT | Mod: GP,CQ

## 2025-08-20 PROCEDURE — 97110 THERAPEUTIC EXERCISES: CPT | Mod: GP,CQ

## 2025-08-27 ENCOUNTER — TREATMENT (OUTPATIENT)
Dept: PHYSICAL THERAPY | Facility: CLINIC | Age: 30
End: 2025-08-27
Payer: COMMERCIAL

## 2025-08-27 DIAGNOSIS — S83.411D SPRAIN OF MEDIAL COLLATERAL LIGAMENT OF RIGHT KNEE, SUBSEQUENT ENCOUNTER: Primary | ICD-10-CM

## 2025-08-27 DIAGNOSIS — S83.411A SPRAIN OF MEDIAL COLLATERAL LIGAMENT OF RIGHT KNEE: ICD-10-CM

## 2025-08-27 PROCEDURE — 97035 APP MDLTY 1+ULTRASOUND EA 15: CPT | Mod: GP

## 2025-08-27 PROCEDURE — 97110 THERAPEUTIC EXERCISES: CPT | Mod: GP

## 2025-09-03 ENCOUNTER — TREATMENT (OUTPATIENT)
Dept: PHYSICAL THERAPY | Facility: CLINIC | Age: 30
End: 2025-09-03
Payer: COMMERCIAL

## 2025-09-03 DIAGNOSIS — S83.411A SPRAIN OF MEDIAL COLLATERAL LIGAMENT OF RIGHT KNEE: ICD-10-CM

## 2025-09-03 PROCEDURE — 97110 THERAPEUTIC EXERCISES: CPT | Mod: GP,CQ

## 2025-09-03 PROCEDURE — 97035 APP MDLTY 1+ULTRASOUND EA 15: CPT | Mod: GP,CQ

## 2026-02-10 ENCOUNTER — APPOINTMENT (OUTPATIENT)
Dept: OPHTHALMOLOGY | Facility: CLINIC | Age: 31
End: 2026-02-10
Payer: COMMERCIAL